# Patient Record
Sex: FEMALE | Race: BLACK OR AFRICAN AMERICAN | Employment: FULL TIME
[De-identification: names, ages, dates, MRNs, and addresses within clinical notes are randomized per-mention and may not be internally consistent; named-entity substitution may affect disease eponyms.]

---

## 2017-02-12 DIAGNOSIS — L30.9 ECZEMA: ICD-10-CM

## 2017-02-12 DIAGNOSIS — R05.9 COUGH: ICD-10-CM

## 2017-02-13 RX ORDER — HALOBETASOL PROPIONATE 0.05 %
OINTMENT (GRAM) TOPICAL
Qty: 50 G | Refills: 3 | Status: SHIPPED | OUTPATIENT
Start: 2017-02-13 | End: 2017-12-01 | Stop reason: SDUPTHER

## 2017-02-27 ENCOUNTER — OFFICE VISIT (OUTPATIENT)
Dept: FAMILY MEDICINE CLINIC | Facility: CLINIC | Age: 47
End: 2017-02-27

## 2017-02-27 VITALS
SYSTOLIC BLOOD PRESSURE: 142 MMHG | WEIGHT: 256 LBS | HEIGHT: 69 IN | RESPIRATION RATE: 16 BRPM | HEART RATE: 81 BPM | BODY MASS INDEX: 37.92 KG/M2 | DIASTOLIC BLOOD PRESSURE: 90 MMHG | OXYGEN SATURATION: 97 %

## 2017-02-27 DIAGNOSIS — H10.9 BACTERIAL CONJUNCTIVITIS OF LEFT EYE: Primary | ICD-10-CM

## 2017-02-27 DIAGNOSIS — R09.82 POST-NASAL DRIP: ICD-10-CM

## 2017-02-27 DIAGNOSIS — R05.9 COUGH: ICD-10-CM

## 2017-02-27 PROCEDURE — 99214 OFFICE O/P EST MOD 30 MIN: CPT | Performed by: FAMILY MEDICINE

## 2017-02-27 PROCEDURE — G8427 DOCREV CUR MEDS BY ELIG CLIN: HCPCS | Performed by: FAMILY MEDICINE

## 2017-02-27 PROCEDURE — G8417 CALC BMI ABV UP PARAM F/U: HCPCS | Performed by: FAMILY MEDICINE

## 2017-02-27 PROCEDURE — G8484 FLU IMMUNIZE NO ADMIN: HCPCS | Performed by: FAMILY MEDICINE

## 2017-02-27 PROCEDURE — 1036F TOBACCO NON-USER: CPT | Performed by: FAMILY MEDICINE

## 2017-02-27 RX ORDER — FLUCONAZOLE 150 MG/1
150 TABLET ORAL ONCE
Qty: 1 TABLET | Refills: 3 | Status: SHIPPED | OUTPATIENT
Start: 2017-02-27 | End: 2017-02-27

## 2017-02-27 RX ORDER — FLUTICASONE PROPIONATE 50 MCG
1 SPRAY, SUSPENSION (ML) NASAL DAILY
Qty: 1 BOTTLE | Refills: 3 | Status: SHIPPED | OUTPATIENT
Start: 2017-02-27 | End: 2018-03-15 | Stop reason: SDUPTHER

## 2017-02-27 RX ORDER — CIPROFLOXACIN HYDROCHLORIDE 3.5 MG/ML
1 SOLUTION/ DROPS TOPICAL
Qty: 1 BOTTLE | Refills: 0 | Status: SHIPPED | OUTPATIENT
Start: 2017-02-27 | End: 2017-03-06

## 2017-03-13 ENCOUNTER — TELEPHONE (OUTPATIENT)
Dept: FAMILY MEDICINE CLINIC | Age: 47
End: 2017-03-13

## 2017-03-15 DIAGNOSIS — K59.4 PROCTALGIA FUGAX: ICD-10-CM

## 2017-03-15 DIAGNOSIS — K22.89 PAIN OF ESOPHAGUS: ICD-10-CM

## 2017-03-15 DIAGNOSIS — Z87.39 HISTORY OF OSGOOD-SCHLATTER DISEASE: ICD-10-CM

## 2017-03-15 DIAGNOSIS — Z87.42 HISTORY OF ENDOMETRIOSIS: ICD-10-CM

## 2017-03-15 DIAGNOSIS — M54.2 PAIN IN NECK: Primary | ICD-10-CM

## 2017-03-27 DIAGNOSIS — S46.911A MUSCLE STRAIN, SHOULDER REGION, RIGHT, INITIAL ENCOUNTER: ICD-10-CM

## 2017-03-27 RX ORDER — METFORMIN HYDROCHLORIDE 1000 MG/1
TABLET, FILM COATED, EXTENDED RELEASE ORAL
Qty: 30 TABLET | Refills: 3 | Status: SHIPPED | OUTPATIENT
Start: 2017-03-27 | End: 2017-10-29 | Stop reason: SDUPTHER

## 2017-03-27 RX ORDER — CYCLOBENZAPRINE HCL 5 MG
TABLET ORAL
Qty: 60 TABLET | Refills: 0 | Status: SHIPPED | OUTPATIENT
Start: 2017-03-27 | End: 2017-12-01 | Stop reason: SDUPTHER

## 2017-06-19 ENCOUNTER — TELEPHONE (OUTPATIENT)
Dept: FAMILY MEDICINE CLINIC | Age: 47
End: 2017-06-19

## 2017-12-01 ENCOUNTER — OFFICE VISIT (OUTPATIENT)
Dept: FAMILY MEDICINE CLINIC | Age: 47
End: 2017-12-01
Payer: COMMERCIAL

## 2017-12-01 ENCOUNTER — HOSPITAL ENCOUNTER (OUTPATIENT)
Age: 47
Setting detail: SPECIMEN
Discharge: HOME | End: 2017-12-01
Payer: COMMERCIAL

## 2017-12-01 VITALS
OXYGEN SATURATION: 96 % | BODY MASS INDEX: 38.36 KG/M2 | WEIGHT: 259 LBS | DIASTOLIC BLOOD PRESSURE: 90 MMHG | HEIGHT: 69 IN | HEART RATE: 81 BPM | SYSTOLIC BLOOD PRESSURE: 118 MMHG

## 2017-12-01 DIAGNOSIS — N62 MACROMASTIA: ICD-10-CM

## 2017-12-01 DIAGNOSIS — L30.9 ECZEMA, UNSPECIFIED TYPE: ICD-10-CM

## 2017-12-01 DIAGNOSIS — Z12.39 SCREENING FOR BREAST CANCER: ICD-10-CM

## 2017-12-01 DIAGNOSIS — S46.911A MUSCLE STRAIN, SHOULDER REGION, RIGHT, INITIAL ENCOUNTER: Primary | ICD-10-CM

## 2017-12-01 DIAGNOSIS — E03.9 HYPOTHYROIDISM, UNSPECIFIED TYPE: ICD-10-CM

## 2017-12-01 LAB
-: ABNORMAL
ABSOLUTE EOS #: 0.04 K/UL (ref 0–0.44)
ABSOLUTE IMMATURE GRANULOCYTE: 0.03 K/UL (ref 0–0.3)
ABSOLUTE LYMPH #: 2.7 K/UL (ref 1.1–3.7)
ABSOLUTE MONO #: 0.43 K/UL (ref 0.1–1.2)
ALBUMIN SERPL-MCNC: 3.9 G/DL (ref 3.5–5.2)
ALBUMIN/GLOBULIN RATIO: 1 (ref 1–2.5)
ALP BLD-CCNC: 62 U/L (ref 35–104)
ALT SERPL-CCNC: 10 U/L (ref 5–33)
AMORPHOUS: ABNORMAL
ANION GAP SERPL CALCULATED.3IONS-SCNC: 16 MMOL/L (ref 9–17)
AST SERPL-CCNC: 14 U/L
BACTERIA: ABNORMAL
BASOPHILS # BLD: 1 % (ref 0–2)
BASOPHILS ABSOLUTE: 0.1 K/UL (ref 0–0.2)
BILIRUB SERPL-MCNC: 0.39 MG/DL (ref 0.3–1.2)
BILIRUBIN URINE: NEGATIVE
BUN BLDV-MCNC: 10 MG/DL (ref 6–20)
BUN/CREAT BLD: NORMAL (ref 9–20)
C-REACTIVE PROTEIN: 7.9 MG/L (ref 0–5)
CALCIUM SERPL-MCNC: 8.8 MG/DL (ref 8.6–10.4)
CASTS UA: ABNORMAL /LPF (ref 0–8)
CHLORIDE BLD-SCNC: 103 MMOL/L (ref 98–107)
CHOLESTEROL/HDL RATIO: 3.8
CHOLESTEROL: 207 MG/DL
CO2: 23 MMOL/L (ref 20–31)
COLOR: YELLOW
COMMENT UA: ABNORMAL
CREAT SERPL-MCNC: 0.74 MG/DL (ref 0.5–0.9)
CRYSTALS, UA: ABNORMAL /HPF
DIFFERENTIAL TYPE: NORMAL
EOSINOPHILS RELATIVE PERCENT: 1 % (ref 1–4)
EPITHELIAL CELLS UA: ABNORMAL /HPF (ref 0–5)
ESTIMATED AVERAGE GLUCOSE: 91 MG/DL
GFR AFRICAN AMERICAN: >60 ML/MIN
GFR NON-AFRICAN AMERICAN: >60 ML/MIN
GFR SERPL CREATININE-BSD FRML MDRD: NORMAL ML/MIN/{1.73_M2}
GFR SERPL CREATININE-BSD FRML MDRD: NORMAL ML/MIN/{1.73_M2}
GLUCOSE BLD-MCNC: 80 MG/DL (ref 70–99)
GLUCOSE URINE: NEGATIVE
HBA1C MFR BLD: 4.8 % (ref 4–6)
HCT VFR BLD CALC: 44.1 % (ref 36.3–47.1)
HDLC SERPL-MCNC: 54 MG/DL
HEMOGLOBIN: 13.4 G/DL (ref 11.9–15.1)
IMMATURE GRANULOCYTES: 0 %
INSULIN COMMENT: NORMAL
INSULIN REFERENCE RANGE:: NORMAL
INSULIN: 19.2 MU/L
KETONES, URINE: NEGATIVE
LDL CHOLESTEROL: 141 MG/DL (ref 0–130)
LEUKOCYTE ESTERASE, URINE: NEGATIVE
LYMPHOCYTES # BLD: 34 % (ref 24–43)
MCH RBC QN AUTO: 28.9 PG (ref 25.2–33.5)
MCHC RBC AUTO-ENTMCNC: 30.4 G/DL (ref 28.4–34.8)
MCV RBC AUTO: 95 FL (ref 82.6–102.9)
MONOCYTES # BLD: 5 % (ref 3–12)
MUCUS: ABNORMAL
NITRITE, URINE: NEGATIVE
OTHER OBSERVATIONS UA: ABNORMAL
PDW BLD-RTO: 12 % (ref 11.8–14.4)
PH UA: 6 (ref 5–8)
PLATELET # BLD: 440 K/UL (ref 138–453)
PLATELET ESTIMATE: NORMAL
PMV BLD AUTO: 8.7 FL (ref 8.1–13.5)
POTASSIUM SERPL-SCNC: 4.4 MMOL/L (ref 3.7–5.3)
PROTEIN UA: NEGATIVE
RBC # BLD: 4.64 M/UL (ref 3.95–5.11)
RBC # BLD: NORMAL 10*6/UL
RBC UA: ABNORMAL /HPF (ref 0–4)
RENAL EPITHELIAL, UA: ABNORMAL /HPF
SEDIMENTATION RATE, ERYTHROCYTE: 36 MM (ref 0–20)
SEG NEUTROPHILS: 59 % (ref 36–65)
SEGMENTED NEUTROPHILS ABSOLUTE COUNT: 4.68 K/UL (ref 1.5–8.1)
SODIUM BLD-SCNC: 142 MMOL/L (ref 135–144)
SPECIFIC GRAVITY UA: 1.02 (ref 1–1.03)
THYROXINE, FREE: 0.87 NG/DL (ref 0.93–1.7)
TOTAL PROTEIN: 7.9 G/DL (ref 6.4–8.3)
TRICHOMONAS: ABNORMAL
TRIGL SERPL-MCNC: 61 MG/DL
TSH SERPL DL<=0.05 MIU/L-ACNC: 1.07 MIU/L (ref 0.3–5)
TURBIDITY: CLEAR
URINE HGB: ABNORMAL
UROBILINOGEN, URINE: NORMAL
VLDLC SERPL CALC-MCNC: ABNORMAL MG/DL (ref 1–30)
WBC # BLD: 8 K/UL (ref 3.5–11.3)
WBC # BLD: NORMAL 10*3/UL
WBC UA: ABNORMAL /HPF (ref 0–5)
YEAST: ABNORMAL

## 2017-12-01 PROCEDURE — G8427 DOCREV CUR MEDS BY ELIG CLIN: HCPCS | Performed by: FAMILY MEDICINE

## 2017-12-01 PROCEDURE — G8417 CALC BMI ABV UP PARAM F/U: HCPCS | Performed by: FAMILY MEDICINE

## 2017-12-01 PROCEDURE — 1036F TOBACCO NON-USER: CPT | Performed by: FAMILY MEDICINE

## 2017-12-01 PROCEDURE — 99214 OFFICE O/P EST MOD 30 MIN: CPT | Performed by: FAMILY MEDICINE

## 2017-12-01 PROCEDURE — G8482 FLU IMMUNIZE ORDER/ADMIN: HCPCS | Performed by: FAMILY MEDICINE

## 2017-12-01 RX ORDER — CYCLOBENZAPRINE HCL 5 MG
TABLET ORAL
Qty: 180 TABLET | Refills: 0 | Status: SHIPPED | OUTPATIENT
Start: 2017-12-01 | End: 2018-10-01 | Stop reason: SDUPTHER

## 2017-12-01 RX ORDER — NAPROXEN 500 MG/1
500 TABLET ORAL 2 TIMES DAILY WITH MEALS
Qty: 180 TABLET | Refills: 0 | Status: SHIPPED | OUTPATIENT
Start: 2017-12-01 | End: 2018-10-01 | Stop reason: SDUPTHER

## 2017-12-01 RX ORDER — LEVOTHYROXINE SODIUM 0.03 MG/1
TABLET ORAL
Qty: 90 TABLET | Refills: 0 | Status: SHIPPED | OUTPATIENT
Start: 2017-12-01

## 2017-12-01 RX ORDER — HALOBETASOL PROPIONATE 0.05 %
OINTMENT (GRAM) TOPICAL
Qty: 50 G | Refills: 3 | Status: SHIPPED | OUTPATIENT
Start: 2017-12-01 | End: 2019-08-14

## 2017-12-01 RX ORDER — ETODOLAC 400 MG/1
TABLET, FILM COATED ORAL
Refills: 0 | COMMUNITY
Start: 2017-11-10

## 2017-12-01 NOTE — PROGRESS NOTES
Visit Information    Have you changed or started any medications since your last visit including any over-the-counter medicines, vitamins, or herbal medicines? no   Have you stopped taking any of your medications? Is so, why? -  no  Are you having any side effects from any of your medications? - no    Have you seen any other physician or provider since your last visit? yes    Have you had any other diagnostic tests since your last visit? yes    Have you been seen in the emergency room and/or had an admission in a hospital since we last saw you?  no   Have you had your routine dental cleaning in the past 6 months?  yes     Do you have an active MyChart account? If no, what is the barrier?   Yes    Patient Care Team:  Loni Espinal MD as PCP - General (Family Medicine)    Medical History Review  Past Medical, Family, and Social History reviewed and does not contribute to the patient presenting condition    Health Maintenance   Topic Date Due    HIV screen  08/19/1985    Flu vaccine (1) 09/01/2017    Cervical cancer screen  12/16/2018    Lipid screen  08/04/2021    DTaP/Tdap/Td vaccine (2 - Td) 08/04/2026

## 2017-12-01 NOTE — PROGRESS NOTES
At.  She wants to know what she can do about this. Patient also wants to know what she can lose weight. She has been trying but did not lose any weight so far. Also wants to know her insulin level. Because she knows she is insulin resistance. Review of Systems   Constitutional: Negative for fever and unexpected weight change. Respiratory: Negative for cough and shortness of breath. Cardiovascular: Negative for chest pain and leg swelling. Gastrointestinal: Negative for diarrhea, constipation and blood in stool. Genitourinary: Negative for dysuria and hematuria. for large breasts. Musculoskeletal: Negative for gait problem. Hospital to for lower back pain. Positive for pain in her groin area on and off and lifting the legs. Skin: Negative for color change and rash. Neurological: Negative for dizziness and headaches. Psychiatric/Behavioral: Negative for confusion and agitation. Objective:   Physical Exam  Constitutional: VS (see above). General appearance: normal development, habitus and attention, no deformities. Eyes: normal conjunctiva and lids. CAV: RRR, no RMG. No edema lower extremities. Pulmo: CTA bilateral, no CWR. Abdomen: Soft. Bowel sounds positive. Soft. Nontender to palpate. No guarding or rebounding. Skin: no rashes, lesions or ulcers. Musculoskeletal: normal gait. Nails: no clubbing or cyanosis. There is some tenderness in her mid lower back present. More so paraspinal area. Psychiatric: alert and oriented to place, time and person. Normal mood and affect. Assessment:      1. Muscle strain, shoulder region, right, initial encounter  cyclobenzaprine (FLEXERIL) 5 MG tablet   2. Eczema, unspecified type  CBC Auto Differential    Comprehensive Metabolic Panel    Urinalysis    TSH without Reflex    T4, Free    Lipid Panel    Hemoglobin A1C    Thyroid Peroxidase Antibody    Sedimentation Rate    C-Reactive Protein    Insulin, total   3.  Macromastia  CBC Auto Differential    Comprehensive Metabolic Panel    Urinalysis    TSH without Reflex    T4, Free    Lipid Panel    Hemoglobin A1C    Thyroid Peroxidase Antibody    Sedimentation Rate    C-Reactive Protein    Insulin, total   4. Hypothyroidism, unspecified type     5. Screening for breast cancer  [facility] Screen Routine       Plan:   I will order multiple laboratory studies. I discussed with the patient that she start needs to start exercising for lower back. His ibuprofen. Flexeril. I discussed with her losing weight. It is a lifestyle change. Small portions. Exercising. I did give her a printout about Adipex. If needed, she should come in and fill it. She needs to lose weight before she has a breast reduction. Call or return to clinic prn if these symptoms worsen or fail to improve as anticipated. I have reviewed the instructions with the patient, answering all questions to her satisfaction. Return in about 4 weeks (around 12/29/2017), or if symptoms worsen or fail to improve. Orders Placed This Encounter   Procedures    [facility] Screen Routine     Order Specific Question:   Reason for exam:     Answer:   screening    CBC Auto Differential     Standing Status:   Future     Number of Occurrences:   1     Standing Expiration Date:   12/1/2018    Comprehensive Metabolic Panel     Standing Status:   Future     Number of Occurrences:   1     Standing Expiration Date:   12/1/2018    Urinalysis     Standing Status:   Future     Number of Occurrences:   1     Standing Expiration Date:   12/1/2018     Order Specific Question:   SPECIFY(EX-CATH,MIDSTREAM,CYSTO,ETC)?      Answer:   midstream    TSH without Reflex     Standing Status:   Future     Number of Occurrences:   1     Standing Expiration Date:   12/1/2018    T4, Free     Standing Status:   Future     Number of Occurrences:   1     Standing Expiration Date:   12/1/2018    Lipid Panel     Standing Status:   Future     Number of Occurrences:   1     Standing

## 2017-12-01 NOTE — PATIENT INSTRUCTIONS
state;  · if you have a history of drug or alcohol abuse; or  · if you are allergic to other diet pills, amphetamines, stimulants, or cold medications. Taking phentermine together with other diet medications such as fenfluramine (Phen-Fen) or dexfenfluramine (Redux) can cause a rare fatal lung disorder called pulmonary hypertension. Do not take phentermine with any other diet medications without your doctor's advice. To make sure you phentermine is safe for you, tell your doctor if you have:  · high blood pressure;  · diabetes;  · kidney disease;  · a thyroid disorder; or  · if you are allergic to aspirin or to yellow food dye (FD & C Yellow No. 5, or tartrazine). Phentermine may be habit forming. Never share phentermine with another person, especially someone with a history of drug abuse or addiction. Keep the medication in a place where others cannot get to it. FDA pregnancy category X. Weight loss during pregnancy can harm an unborn baby, even if you are overweight. Do not use phentermine if you are pregnant. Phentermine can pass into breast milk and may harm a nursing baby. You should not breast-feed while taking phentermine. Do not give this medication to a child younger than 12years old. How should I take phentermine? Follow all directions on your prescription label. Do not take this medicine in larger or smaller amounts or for longer than recommended. Some brands of phentermine should be taken on an empty stomach before breakfast or within 2 hours after breakfast.  Suprenza disintegrating tablets can be taken with or without food. Using dry hands, remove the Suprenza tablet from the medicine bottle and place the tablet in your mouth. It will begin to dissolve right away. Do not swallow the tablet whole. Allow it to dissolve in your mouth without chewing.   To prevent sleep problems, take this medication early in the day, no later than 6:00pm.  Talk with your doctor if you have increased hunger or if you otherwise think the medication is not working properly. Taking more of this medication will not make it more effective and can cause serious, life-threatening side effects. Phentermine should be taken only for a short time, such as a few weeks. Do not stop using phentermine suddenly, or you could have unpleasant withdrawal symptoms. Ask your doctor how to safely stop using phentermine. Store at room temperature away from moisture and heat. Keep track of the amount of medicine used from each new bottle. Phentermine is a drug of abuse and you should be aware if anyone is using your medicine improperly or without a prescription. What happens if I miss a dose? Take the missed dose as soon as you remember. Skip the missed dose if it is almost time for your next scheduled dose. Do not  take extra medicine to make up the missed dose. What happens if I overdose? Seek emergency medical attention or call the Poison Help line at 1-121.775.5425. An overdose of phentermine can be fatal.  What should I avoid while taking phentermine? Drinking alcohol can increase certain side effects of phentermine. Phentermine may impair your thinking or reactions. Be careful if you drive or do anything that requires you to be alert. What are the possible side effects of phentermine? Get emergency medical help if you have any of these signs of an allergic reaction: hives; wheezing, chest tightness, trouble breathing; swelling of your face, lips, tongue, or throat.   Call your doctor at once if you have a serious side effect such as:  · feeling short of breath, even with mild exertion;  · chest pain, feeling like you might pass out;  · swelling in your ankles or feet;  · pounding heartbeats or fluttering in your chest;  · confusion or irritability, unusual thoughts or behavior;  · feelings of extreme happiness or sadness; or  · dangerously high blood pressure (severe headache, blurred vision, buzzing in your ears, anxiety, chest pain, shortness of breath, uneven heartbeats, seizure). Common side effects may include:  · feeling restless or hyperactive;  · headache, dizziness, tremors;  · sleep problems (insomnia);  · dry mouth or an unpleasant taste in your mouth;  · diarrhea or constipation, upset stomach; or  · increased or decreased interest in sex, impotence. This is not a complete list of side effects and others may occur. Call your doctor for medical advice about side effects. You may report side effects to FDA at 8-706-FDA-7387. What other drugs will affect phentermine? Taking this medicine with other stimulant drugs that make you restless or hyperactive can worsen these effects. Ask your doctor before taking phentermine with diet pills, other stimulants, or medicine to treat attention deficit hyperactivity disorder (ADHD). Tell your doctor about all medicines you use, and those you start or stop using during your treatment with phentermine, especially:  · an antidepressant --citalopram, escitalopram, fluoxetine, fluvoxamine, paroxetine, sertraline. This list is not complete. Other drugs may interact with phentermine, including prescription and over-the-counter medicines, vitamins, and herbal products. Not all possible interactions are listed in this medication guide. Where can I get more information? Your pharmacist can provide more information about phentermine. Remember, keep this and all other medicines out of the reach of children, never share your medicines with others, and use this medication only for the indication prescribed. Every effort has been made to ensure that the information provided by [address] is accurate, up-to-date, and complete, but no guarantee is made to that effect. Drug information contained herein may be time sensitive.  [facility] information has been compiled for use by healthcare practitioners and consumers in the [place] and therefore [facility] does not warrant that uses outside of the [place] are appropriate, unless specifically indicated otherwise. [facility]'s drug information does not endorse drugs, diagnose patients or recommend therapy. [facility] drug information is an informational resource designed to assist licensed healthcare practitioners in caring for their patients and/or to serve consumers viewing this service as a supplement to, and not a substitute for, the expertise, skill, knowledge and judgment of healthcare practitioners. The absence of a warning for a given drug or drug combination in no way should be construed to indicate that the drug or drug combination is safe, effective or appropriate for any given patient. [facility] does not assume any responsibility for any aspect of healthcare administered with the aid of information [facility] provides. The information contained herein is not intended to cover all possible uses, directions, precautions, warnings, drug interactions, allergic reactions, or adverse effects. If you have questions about the drugs you are taking, check with your doctor, nurse or pharmacist.  Copyright 5554-4198 [address]. Version: 9.02. Revision date: 10/4/2013. Care instructions adapted under license by [facility] ([facility]). If you have questions about a medical condition or this instruction, always ask your healthcare professional. [place] [address] any warranty or liability for your use of this information.

## 2017-12-04 LAB — THYROID PEROXIDASE (TPO) AB: <10 IU/ML (ref 0–35)

## 2017-12-28 ENCOUNTER — OFFICE VISIT (OUTPATIENT)
Dept: FAMILY MEDICINE CLINIC | Age: 47
End: 2017-12-28
Payer: COMMERCIAL

## 2017-12-28 VITALS
BODY MASS INDEX: 37.92 KG/M2 | OXYGEN SATURATION: 97 % | WEIGHT: 256 LBS | HEART RATE: 85 BPM | SYSTOLIC BLOOD PRESSURE: 122 MMHG | HEIGHT: 69 IN | DIASTOLIC BLOOD PRESSURE: 81 MMHG

## 2017-12-28 DIAGNOSIS — E66.9 OBESITY (BMI 35.0-39.9 WITHOUT COMORBIDITY): ICD-10-CM

## 2017-12-28 DIAGNOSIS — K21.9 GASTROESOPHAGEAL REFLUX DISEASE, ESOPHAGITIS PRESENCE NOT SPECIFIED: Primary | ICD-10-CM

## 2017-12-28 PROCEDURE — G8427 DOCREV CUR MEDS BY ELIG CLIN: HCPCS | Performed by: FAMILY MEDICINE

## 2017-12-28 PROCEDURE — 1036F TOBACCO NON-USER: CPT | Performed by: FAMILY MEDICINE

## 2017-12-28 PROCEDURE — G8482 FLU IMMUNIZE ORDER/ADMIN: HCPCS | Performed by: FAMILY MEDICINE

## 2017-12-28 PROCEDURE — 99213 OFFICE O/P EST LOW 20 MIN: CPT | Performed by: FAMILY MEDICINE

## 2017-12-28 PROCEDURE — G8417 CALC BMI ABV UP PARAM F/U: HCPCS | Performed by: FAMILY MEDICINE

## 2017-12-28 RX ORDER — HYDROCHLOROTHIAZIDE 25 MG/1
12.5 TABLET ORAL DAILY
Qty: 45 TABLET | Refills: 1 | Status: SHIPPED | OUTPATIENT
Start: 2017-12-28 | End: 2018-10-01

## 2017-12-28 RX ORDER — RANITIDINE 150 MG/1
150 TABLET ORAL 2 TIMES DAILY
Qty: 60 TABLET | Refills: 3 | Status: SHIPPED | OUTPATIENT
Start: 2017-12-28 | End: 2019-09-23 | Stop reason: SDUPTHER

## 2017-12-28 RX ORDER — PHENTERMINE HYDROCHLORIDE 37.5 MG/1
37.5 TABLET ORAL
Qty: 30 TABLET | Refills: 0 | Status: SHIPPED | OUTPATIENT
Start: 2017-12-28 | End: 2018-01-27

## 2017-12-28 ASSESSMENT — PATIENT HEALTH QUESTIONNAIRE - PHQ9
SUM OF ALL RESPONSES TO PHQ QUESTIONS 1-9: 0
SUM OF ALL RESPONSES TO PHQ9 QUESTIONS 1 & 2: 0
2. FEELING DOWN, DEPRESSED OR HOPELESS: 0
1. LITTLE INTEREST OR PLEASURE IN DOING THINGS: 0

## 2017-12-28 NOTE — PROGRESS NOTES
General FM note    Cuate Zhang is a 52 y.o. female who presents today for follow up on her  medical conditions as noted below. Cuate Zhang is c/o of   Chief Complaint   Patient presents with    Back Pain       Patient Active Problem List:     Hypothyroidism     Insulin resistance     Right knee pain     History of endometriosis     Proctalgia fugax     Multiple nevi     Edema of both legs     History of Osgood-Schlatter disease     Cough     Breast pain     Hypertrophy of breast     Pain in neck     Past Medical History:   Diagnosis Date    Gout     Hypothyroidism     Insulin resistance     Diagnosed in 2000      Past Surgical History:   Procedure Laterality Date    CYST REMOVAL  2000    FOOT SURGERY  10/2016    HYSTERECTOMY  12/2008    pt has right ovary    LAPAROSCOPY       Family History   Problem Relation Age of Onset    Cancer Mother     Diabetes Father     High Blood Pressure Father     Arthritis Father     Other Sister      BRAIN TUMOR    High Blood Pressure Maternal Grandmother     Cancer Maternal Grandfather      PROSTATE    Diabetes Maternal Grandfather     High Blood Pressure Paternal Grandmother      Current Outpatient Prescriptions   Medication Sig Dispense Refill    metFORMIN (GLUCOPHAGE) 1000 MG tablet Take 1,000 mg by mouth 2 times daily (with meals)      hydrochlorothiazide (HYDRODIURIL) 25 MG tablet Take 0.5 tablets by mouth daily 45 tablet 1    phentermine (ADIPEX-P) 37.5 MG tablet Take 1 tablet by mouth every morning (before breakfast) .  30 tablet 0    ranitidine (ZANTAC) 150 MG tablet Take 1 tablet by mouth 2 times daily 60 tablet 3    etodolac (LODINE) 400 MG tablet take 1 tablet by mouth twice a day  0    halobetasol (ULTRAVATE) 0.05 % ointment apply to affected area twice a day 50 g 3    levothyroxine (SYNTHROID) 25 MCG tablet take 1 tablet by mouth once daily 90 tablet 0    naproxen (NAPROSYN) 500 MG tablet Take 1 tablet by mouth 2 times daily (with meals) 180 stool.   Musculoskeletal: Negative for back pain and gait problem. Skin: Negative for color change and rash. Objective:   Physical Exam  Constitutional: VS (see above). General appearance: normal development, habitus and attention, no deformities. Eyes: normal conjunctiva and lids. CAV: RRR, no RMG. No edema lower extremities. Pulmo: CTA bilateral, no CWR. Skin: no rashes, lesions or ulcers. Musculoskeletal: normal gait. Nails: no clubbing or cyanosis. Psychiatric: alert and oriented to place, time and person. Normal mood and affect. Assessment:      1. Gastroesophageal reflux disease, esophagitis presence not specified  ranitidine (ZANTAC) 150 MG tablet   2. Obesity (BMI 35.0-39.9 without comorbidity)         Plan:   She normally has his reflux. She will start Zantac. She will follow-up in 4 weeks. see if this is working. We'll start patient on Adipex. First prescription provided. Patient will continue current diet and exercise regimen. I discussed with her to exercise at least 30 minutes 5 times a week. Decrease carbohydrate intake. Increase fibers and protein. See me back in 4 weeks for weight check. Call if any changes. Stop Adipex if you have any side effects. Patient needs to have her urine checked at the next visit. Call or return to clinic prn if these symptoms worsen or fail to improve as anticipated. I have reviewed the instructions with the patient, answering all questions to her satisfaction. Return in about 5 weeks (around 1/29/2018), or if symptoms worsen or fail to improve, for weight. No orders of the defined types were placed in this encounter. Orders Placed This Encounter   Medications    hydrochlorothiazide (HYDRODIURIL) 25 MG tablet     Sig: Take 0.5 tablets by mouth daily     Dispense:  45 tablet     Refill:  1    phentermine (ADIPEX-P) 37.5 MG tablet     Sig: Take 1 tablet by mouth every morning (before breakfast) .      Dispense:  30 tablet     Refill:  0

## 2017-12-28 NOTE — PROGRESS NOTES
Visit Information    Have you changed or started any medications since your last visit including any over-the-counter medicines, vitamins, or herbal medicines? no   Have you stopped taking any of your medications? Is so, why? -  no  Are you having any side effects from any of your medications? - no    Have you seen any other physician or provider since your last visit? yes - Dr Madeline Murphy with [facility]   Have you had any other diagnostic tests since your last visit? yes - xrays   Have you been seen in the emergency room and/or had an admission in a hospital since we last saw you?  no   Have you had your routine dental cleaning in the past 6 months?  yes     Do you have an active MyChart account? If no, what is the barrier?   Yes    Patient Care Team:  Gerson Ceballos MD as PCP - General (Family Medicine)    Medical History Review  Past Medical, Family, and Social History reviewed and does not contribute to the patient presenting condition    Health Maintenance   Topic Date Due    HIV screen  08/19/1985    Cervical cancer screen  12/16/2018    Lipid screen  12/01/2022    DTaP/Tdap/Td vaccine (2 - Td) 08/04/2026    Flu vaccine  Completed

## 2018-01-30 ENCOUNTER — OFFICE VISIT (OUTPATIENT)
Dept: FAMILY MEDICINE CLINIC | Age: 48
End: 2018-01-30
Payer: COMMERCIAL

## 2018-01-30 VITALS
HEIGHT: 69 IN | SYSTOLIC BLOOD PRESSURE: 139 MMHG | OXYGEN SATURATION: 100 % | HEART RATE: 76 BPM | RESPIRATION RATE: 16 BRPM | WEIGHT: 256 LBS | DIASTOLIC BLOOD PRESSURE: 89 MMHG | BODY MASS INDEX: 37.92 KG/M2

## 2018-01-30 DIAGNOSIS — E66.09 CLASS 2 OBESITY DUE TO EXCESS CALORIES WITHOUT SERIOUS COMORBIDITY WITH BODY MASS INDEX (BMI) OF 37.0 TO 37.9 IN ADULT: ICD-10-CM

## 2018-01-30 DIAGNOSIS — K21.9 GASTROESOPHAGEAL REFLUX DISEASE, ESOPHAGITIS PRESENCE NOT SPECIFIED: Primary | ICD-10-CM

## 2018-01-30 PROCEDURE — 99213 OFFICE O/P EST LOW 20 MIN: CPT | Performed by: FAMILY MEDICINE

## 2018-01-30 PROCEDURE — G8427 DOCREV CUR MEDS BY ELIG CLIN: HCPCS | Performed by: FAMILY MEDICINE

## 2018-01-30 PROCEDURE — G8482 FLU IMMUNIZE ORDER/ADMIN: HCPCS | Performed by: FAMILY MEDICINE

## 2018-01-30 PROCEDURE — 1036F TOBACCO NON-USER: CPT | Performed by: FAMILY MEDICINE

## 2018-01-30 PROCEDURE — G8417 CALC BMI ABV UP PARAM F/U: HCPCS | Performed by: FAMILY MEDICINE

## 2018-01-30 RX ORDER — PHENTERMINE HYDROCHLORIDE 37.5 MG/1
37.5 TABLET ORAL
Qty: 30 TABLET | Refills: 0 | Status: SHIPPED | OUTPATIENT
Start: 2018-01-30 | End: 2018-03-01

## 2018-01-30 NOTE — PROGRESS NOTES
normal development, habitus and attention, no deformities. Eyes: normal conjunctiva and lids. CAV: RRR, no RMG. No edema lower extremities. Pulmo: CTA bilateral, no CWR. Skin: no rashes, lesions or ulcers. Musculoskeletal: normal gait. Nails: no clubbing or cyanosis. Psychiatric: alert and oriented to place, time and person. Normal mood and affect. Assessment:      1. Gastroesophageal reflux disease, esophagitis presence not specified     2. Class 2 obesity due to excess calories without serious comorbidity with body mass index (BMI) of 37.0 to 37.9 in adult  phentermine (ADIPEX-P) 37.5 MG tablet       Plan:   We'll continue Zantac for acid reflux. Patient will continue current diet and exercise regimen. I discussed with her to exercise at least 30 minutes 5 times a week. Decrease carbohydrate intake. Increase fibers and protein. See me back in 4 weeks for weight check. Call if any changes. Stop Adipex if you have any side effects. Call or return to clinic prn if these symptoms worsen or fail to improve as anticipated. I have reviewed the instructions with the patient, answering all questions to her satisfaction. Return in about 4 weeks (around 2/27/2018), or if symptoms worsen or fail to improve. No orders of the defined types were placed in this encounter. Orders Placed This Encounter   Medications    phentermine (ADIPEX-P) 37.5 MG tablet     Sig: Take 1 tablet by mouth every morning (before breakfast) for 30 days.      Dispense:  30 tablet     Refill:  0       Electronically signed by Yara Arenas MD on 1/31/2018 at 7:04 AM       (Please note that portions of this note were completed with a voice recognition program. Efforts were made to edit the dictations but occasionally words are mis-transcribed.)

## 2018-03-15 DIAGNOSIS — R09.82 POST-NASAL DRIP: ICD-10-CM

## 2018-03-15 RX ORDER — FLUTICASONE PROPIONATE 50 MCG
SPRAY, SUSPENSION (ML) NASAL
Qty: 16 G | Refills: 3 | Status: SHIPPED | OUTPATIENT
Start: 2018-03-15 | End: 2018-05-24 | Stop reason: SDUPTHER

## 2018-04-17 DIAGNOSIS — Z12.39 SCREENING BREAST EXAMINATION: ICD-10-CM

## 2018-04-17 DIAGNOSIS — N63.20 LEFT BREAST LUMP: Primary | ICD-10-CM

## 2018-04-26 ENCOUNTER — HOSPITAL ENCOUNTER (OUTPATIENT)
Dept: ULTRASOUND IMAGING | Age: 48
Discharge: HOME | End: 2018-04-28
Payer: COMMERCIAL

## 2018-04-26 ENCOUNTER — HOSPITAL ENCOUNTER (OUTPATIENT)
Dept: MAMMOGRAPHY | Age: 48
Discharge: HOME | End: 2018-04-28
Payer: COMMERCIAL

## 2018-04-26 DIAGNOSIS — R92.8 ABNORMAL MAMMOGRAM: ICD-10-CM

## 2018-04-26 DIAGNOSIS — N63.20 LEFT BREAST LUMP: ICD-10-CM

## 2018-04-26 DIAGNOSIS — Z12.39 SCREENING BREAST EXAMINATION: ICD-10-CM

## 2018-04-26 PROCEDURE — G0279 TOMOSYNTHESIS, MAMMO: HCPCS

## 2018-04-26 PROCEDURE — 76642 ULTRASOUND BREAST LIMITED: CPT

## 2018-05-03 ENCOUNTER — TELEPHONE (OUTPATIENT)
Dept: FAMILY MEDICINE CLINIC | Age: 48
End: 2018-05-03

## 2018-05-03 ENCOUNTER — OFFICE VISIT (OUTPATIENT)
Dept: FAMILY MEDICINE CLINIC | Age: 48
End: 2018-05-03
Payer: COMMERCIAL

## 2018-05-03 VITALS
BODY MASS INDEX: 38.36 KG/M2 | DIASTOLIC BLOOD PRESSURE: 85 MMHG | WEIGHT: 259 LBS | HEIGHT: 69 IN | HEART RATE: 86 BPM | SYSTOLIC BLOOD PRESSURE: 127 MMHG | OXYGEN SATURATION: 96 % | RESPIRATION RATE: 16 BRPM

## 2018-05-03 DIAGNOSIS — R53.83 OTHER FATIGUE: ICD-10-CM

## 2018-05-03 DIAGNOSIS — R00.2 PALPITATION: Primary | ICD-10-CM

## 2018-05-03 LAB
BASOPHILS ABSOLUTE: NORMAL /ΜL
BASOPHILS RELATIVE PERCENT: NORMAL %
BUN BLDV-MCNC: NORMAL MG/DL
CALCIUM SERPL-MCNC: NORMAL MG/DL
CHLORIDE BLD-SCNC: NORMAL MMOL/L
CO2: NORMAL MMOL/L
CREAT SERPL-MCNC: NORMAL MG/DL
EOSINOPHILS ABSOLUTE: NORMAL /ΜL
EOSINOPHILS RELATIVE PERCENT: NORMAL %
GFR CALCULATED: NORMAL
GLUCOSE BLD-MCNC: NORMAL MG/DL
HCT VFR BLD CALC: NORMAL % (ref 36–46)
HEMOGLOBIN: NORMAL G/DL (ref 12–16)
LYMPHOCYTES ABSOLUTE: NORMAL /ΜL
LYMPHOCYTES RELATIVE PERCENT: NORMAL %
MCH RBC QN AUTO: NORMAL PG
MCHC RBC AUTO-ENTMCNC: NORMAL G/DL
MCV RBC AUTO: NORMAL FL
MONOCYTES ABSOLUTE: NORMAL /ΜL
MONOCYTES RELATIVE PERCENT: NORMAL %
NEUTROPHILS ABSOLUTE: NORMAL /ΜL
NEUTROPHILS RELATIVE PERCENT: NORMAL %
PDW BLD-RTO: NORMAL %
PLATELET # BLD: NORMAL K/ΜL
PMV BLD AUTO: NORMAL FL
POTASSIUM SERPL-SCNC: NORMAL MMOL/L
RBC # BLD: NORMAL 10^6/ΜL
SODIUM BLD-SCNC: NORMAL MMOL/L
T4 FREE: NORMAL
TSH SERPL DL<=0.05 MIU/L-ACNC: NORMAL UIU/ML
VITAMIN D 25-HYDROXY: NORMAL
VITAMIN D2, 25 HYDROXY: NORMAL
VITAMIN D3,25 HYDROXY: NORMAL
WBC # BLD: NORMAL 10^3/ML

## 2018-05-03 PROCEDURE — 1036F TOBACCO NON-USER: CPT | Performed by: FAMILY MEDICINE

## 2018-05-03 PROCEDURE — G8427 DOCREV CUR MEDS BY ELIG CLIN: HCPCS | Performed by: FAMILY MEDICINE

## 2018-05-03 PROCEDURE — 99213 OFFICE O/P EST LOW 20 MIN: CPT | Performed by: FAMILY MEDICINE

## 2018-05-03 PROCEDURE — G8417 CALC BMI ABV UP PARAM F/U: HCPCS | Performed by: FAMILY MEDICINE

## 2018-05-04 ENCOUNTER — HOSPITAL ENCOUNTER (OUTPATIENT)
Dept: NON INVASIVE DIAGNOSTICS | Age: 48
Discharge: HOME | End: 2018-05-04
Payer: COMMERCIAL

## 2018-05-04 DIAGNOSIS — R00.2 PALPITATION: ICD-10-CM

## 2018-05-04 PROCEDURE — 93226 XTRNL ECG REC<48 HR SCAN A/R: CPT

## 2018-05-04 PROCEDURE — 93225 XTRNL ECG REC<48 HRS REC: CPT

## 2018-05-07 DIAGNOSIS — R53.83 OTHER FATIGUE: ICD-10-CM

## 2018-05-08 DIAGNOSIS — E55.9 VITAMIN D DEFICIENCY: Primary | ICD-10-CM

## 2018-05-08 LAB
ACQUISITION DURATION: NORMAL S
AVERAGE HEART RATE: 89 BPM
FASTEST SUPRAVENTRICULAR RATE: 131 BPM
HOOKUP DATE: NORMAL
HOOKUP TIME: NORMAL
LONGEST SUPRAVENTRICULAR RATE: 131 BPM
MAX HEART RATE TIME/DATE: NORMAL
MAX HEART RATE: 137 BPM
MIN HEART RATE TIME/DATE: NORMAL
MIN HEART RATE: 61 BPM
NUMBER OF FASTEST SUPRAVENTRICULAR BEATS: 11
NUMBER OF LONGEST SUPRAVENTRICULAR BEATS: 11
NUMBER OF QRS COMPLEXES: NORMAL
NUMBER OF SUPRAVENTRICULAR BEATS IN RUNS: 11
NUMBER OF SUPRAVENTRICULAR COUPLETS: 0
NUMBER OF SUPRAVENTRICULAR ECTOPICS: 431
NUMBER OF SUPRAVENTRICULAR ISOLATED BEATS: 420
NUMBER OF SUPRAVENTRICULAR RUNS: 1
NUMBER OF VENTRICULAR BEATS IN RUNS: 0
NUMBER OF VENTRICULAR BIGEMINAL CYCLES: 3
NUMBER OF VENTRICULAR COUPLETS: 4
NUMBER OF VENTRICULAR ECTOPICS: 17
NUMBER OF VENTRICULAR ISOLATED BEATS: 9
NUMBER OF VENTRICULAR RUNS: 0

## 2018-05-08 RX ORDER — ERGOCALCIFEROL (VITAMIN D2) 1250 MCG
50000 CAPSULE ORAL WEEKLY
Qty: 4 CAPSULE | Refills: 3 | Status: SHIPPED | OUTPATIENT
Start: 2018-05-08

## 2018-05-24 ENCOUNTER — OFFICE VISIT (OUTPATIENT)
Dept: FAMILY MEDICINE CLINIC | Age: 48
End: 2018-05-24
Payer: COMMERCIAL

## 2018-05-24 VITALS
WEIGHT: 259.2 LBS | RESPIRATION RATE: 16 BRPM | SYSTOLIC BLOOD PRESSURE: 144 MMHG | HEART RATE: 96 BPM | DIASTOLIC BLOOD PRESSURE: 94 MMHG | OXYGEN SATURATION: 97 % | BODY MASS INDEX: 38.39 KG/M2

## 2018-05-24 DIAGNOSIS — R09.82 POST-NASAL DRIP: ICD-10-CM

## 2018-05-24 DIAGNOSIS — I47.1 SVT (SUPRAVENTRICULAR TACHYCARDIA) (HCC): Primary | ICD-10-CM

## 2018-05-24 PROCEDURE — 1036F TOBACCO NON-USER: CPT | Performed by: FAMILY MEDICINE

## 2018-05-24 PROCEDURE — G8427 DOCREV CUR MEDS BY ELIG CLIN: HCPCS | Performed by: FAMILY MEDICINE

## 2018-05-24 PROCEDURE — G8417 CALC BMI ABV UP PARAM F/U: HCPCS | Performed by: FAMILY MEDICINE

## 2018-05-24 PROCEDURE — 99214 OFFICE O/P EST MOD 30 MIN: CPT | Performed by: FAMILY MEDICINE

## 2018-05-24 RX ORDER — FLUTICASONE PROPIONATE 50 MCG
1 SPRAY, SUSPENSION (ML) NASAL DAILY
Qty: 16 G | Refills: 3 | Status: SHIPPED | OUTPATIENT
Start: 2018-05-24

## 2018-05-24 RX ORDER — AZITHROMYCIN 250 MG/1
TABLET, FILM COATED ORAL
Qty: 6 TABLET | Refills: 0 | Status: SHIPPED | OUTPATIENT
Start: 2018-05-24 | End: 2018-06-03

## 2018-10-01 ENCOUNTER — OFFICE VISIT (OUTPATIENT)
Dept: FAMILY MEDICINE CLINIC | Age: 48
End: 2018-10-01
Payer: COMMERCIAL

## 2018-10-01 VITALS
DIASTOLIC BLOOD PRESSURE: 90 MMHG | HEART RATE: 78 BPM | RESPIRATION RATE: 16 BRPM | SYSTOLIC BLOOD PRESSURE: 138 MMHG | HEIGHT: 68 IN | OXYGEN SATURATION: 97 % | WEIGHT: 291 LBS | TEMPERATURE: 97.9 F | BODY MASS INDEX: 44.1 KG/M2

## 2018-10-01 DIAGNOSIS — N62 MACROMASTIA: ICD-10-CM

## 2018-10-01 DIAGNOSIS — I10 ESSENTIAL HYPERTENSION: Primary | ICD-10-CM

## 2018-10-01 DIAGNOSIS — S46.911A MUSCLE STRAIN, SHOULDER REGION, RIGHT, INITIAL ENCOUNTER: ICD-10-CM

## 2018-10-01 DIAGNOSIS — Z23 NEED FOR INFLUENZA VACCINATION: ICD-10-CM

## 2018-10-01 PROCEDURE — 90471 IMMUNIZATION ADMIN: CPT | Performed by: FAMILY MEDICINE

## 2018-10-01 PROCEDURE — G8417 CALC BMI ABV UP PARAM F/U: HCPCS | Performed by: FAMILY MEDICINE

## 2018-10-01 PROCEDURE — G8482 FLU IMMUNIZE ORDER/ADMIN: HCPCS | Performed by: FAMILY MEDICINE

## 2018-10-01 PROCEDURE — G8427 DOCREV CUR MEDS BY ELIG CLIN: HCPCS | Performed by: FAMILY MEDICINE

## 2018-10-01 PROCEDURE — 90686 IIV4 VACC NO PRSV 0.5 ML IM: CPT | Performed by: FAMILY MEDICINE

## 2018-10-01 PROCEDURE — 99214 OFFICE O/P EST MOD 30 MIN: CPT | Performed by: FAMILY MEDICINE

## 2018-10-01 PROCEDURE — 1036F TOBACCO NON-USER: CPT | Performed by: FAMILY MEDICINE

## 2018-10-01 RX ORDER — CYCLOBENZAPRINE HCL 5 MG
TABLET ORAL
Qty: 180 TABLET | Refills: 0 | Status: SHIPPED | OUTPATIENT
Start: 2018-10-01

## 2018-10-01 RX ORDER — NAPROXEN 500 MG/1
500 TABLET ORAL 2 TIMES DAILY WITH MEALS
Qty: 180 TABLET | Refills: 0 | Status: SHIPPED | OUTPATIENT
Start: 2018-10-01 | End: 2019-08-12 | Stop reason: SDUPTHER

## 2018-10-01 RX ORDER — AMLODIPINE BESYLATE 5 MG/1
2.5 TABLET ORAL DAILY
Qty: 15 TABLET | Refills: 1 | Status: SHIPPED | OUTPATIENT
Start: 2018-10-01 | End: 2018-12-21 | Stop reason: SDUPTHER

## 2018-10-01 NOTE — PROGRESS NOTES
Visit Information    Have you changed or started any medications since your last visit including any over-the-counter medicines, vitamins, or herbal medicines? no   Are you having any side effects from any of your medications? -  no  Have you stopped taking any of your medications? Is so, why? -  yes , HCTZ    Have you seen any other physician or provider since your last visit? Yes - Records Requested  cardiologistHave you had any other diagnostic tests since your last visit? Yes - Records Obtained ECHO  Have you been seen in the emergency room and/or had an admission to a hospital since we last saw you? No  Have you had your routine dental cleaning in the past 6 months? yes     Have you activated your [employer] account? If not, what are your barriers?  Yes     Patient Care Team:  Pastor Paige MD as PCP - General (Family Medicine)    Medical History Review  Past Medical, Family, and Social History reviewed and does not contribute to the patient presenting condition    Health Maintenance   Topic Date Due    HIV screen  08/19/1985    Flu vaccine (1) 09/01/2018    Cervical cancer screen  12/16/2018    TSH testing  05/03/2019    Potassium monitoring  05/03/2019    Creatinine monitoring  05/03/2019    Lipid screen  12/01/2022    DTaP/Tdap/Td vaccine (2 - Td) 08/04/2026

## 2018-10-01 NOTE — PATIENT INSTRUCTIONS
your chest pain is severe or ongoing. If you are being treated for high blood pressure, keep using amlodipine even if you feel well. High blood pressure often has no symptoms. You may need to use blood pressure medicine for the rest of your life. Your hypertension or heart condition may be treated with a combination of drugs. Use all medications as directed by your doctor. Read the medication guide or patient instructions provided with each medication. Do not change your doses or stop taking any of your medications without your doctor's advice. This is especially important if you also take nitroglycerin. Amlodipine is only part of a complete program of treatment that may also include diet, exercise, weight control, and other medications. Follow your diet, medication, and exercise routines very closely. Store at room temperature away from moisture, heat, and light. What happens if I miss a dose? Take the missed dose as soon as you remember. If you are more than 12 hours late, skip the missed dose. Do not  take extra medicine to make up the missed dose. What happens if I overdose? Seek emergency medical attention or call the Poison Help line at 1-147.630.6288. Overdose symptoms may include rapid heartbeats, redness or warmth in your arms or legs, or fainting. What should I avoid while taking amlodipine? Avoid getting up too fast from a sitting or lying position, or you may feel dizzy. Get up slowly and steady yourself to prevent a fall. What are the possible side effects of amlodipine? Get emergency medical help if you have signs of an allergic reaction:  hives; difficulty breathing; swelling of your face, lips, tongue, or throat. In rare cases, when you first start taking amlodipine, your angina may get worse or you could have a heart attack.  Seek emergency medical attention or call your doctor right away if you have symptoms such as: chest pain or pressure, pain spreading to your jaw or shoulder, nausea, sweating. Call your doctor at once if you have:  · pounding heartbeats or fluttering in your chest;  · worsening chest pain;  · swelling in your feet or ankles;  · severe drowsiness; or  · a light-headed feeling, like you might pass out. Common side effects may include:  · dizziness;  · feeling tired;  · stomach pain, nausea; or  · flushing (warmth, redness, or tingly feeling). This is not a complete list of side effects and others may occur. Call your doctor for medical advice about side effects. You may report side effects to FDA at 4-139-ITF-4372. What other drugs will affect amlodipine? Tell your doctor about all your current medicines and any you start or stop using, especially:  · nitroglycerin;  · simvastatin (Zocor, Simcor, Vytorin); or  · any other heart or blood pressure medications. This list is not complete. Other drugs may interact with amlodipine, including prescription and over-the-counter medicines, vitamins, and herbal products. Not all possible interactions are listed in this medication guide. Where can I get more information? Your pharmacist can provide more information about amlodipine. Remember, keep this and all other medicines out of the reach of children, never share your medicines with others, and use this medication only for the indication prescribed. Every effort has been made to ensure that the information provided by [facility] [address] is accurate, up-to-date, and complete, but no guarantee is made to that effect. Drug information contained herein may be time sensitive. [facility] information has been compiled for use by healthcare practitioners and consumers in the [place] and therefore [employer] does not warrant that uses outside of the [place] are appropriate, unless specifically indicated otherwise. [facility]'s drug information does not endorse drugs, diagnose patients or recommend therapy.  [facility]'s drug information is an informational resource

## 2018-10-01 NOTE — PROGRESS NOTES
General FM note    Tuan Joy is a 50 y.o. female who presents today for follow up on her  medical conditions as noted below.   Tuan Joy is c/o of   Chief Complaint   Patient presents with    Blood Pressure Check     says blood pressure has been running high 160/90       Patient Active Problem List:     Hypothyroidism     Insulin resistance     Right knee pain     History of endometriosis     Proctalgia fugax     Multiple nevi     Edema of both legs     History of Osgood-Schlatter disease     Cough     Breast pain     Hypertrophy of breast     Pain in neck     Past Medical History:   Diagnosis Date    Gout     Hypothyroidism     Insulin resistance     Diagnosed in 2000      Past Surgical History:   Procedure Laterality Date    CYST REMOVAL  2000    FOOT SURGERY  10/2016    HYSTERECTOMY  12/2008    pt has right ovary    LAPAROSCOPY       Family History   Problem Relation Age of Onset    Cancer Mother     Diabetes Father     High Blood Pressure Father     Arthritis Father     Other Sister         BRAIN TUMOR    High Blood Pressure Maternal Grandmother     Cancer Maternal Grandfather         PROSTATE    Diabetes Maternal Grandfather     High Blood Pressure Paternal Grandmother      Current Outpatient Prescriptions   Medication Sig Dispense Refill    cyclobenzaprine (FLEXERIL) 5 MG tablet take 1 tablet by mouth twice a day if needed for muscle spasm 180 tablet 0    naproxen (NAPROSYN) 500 MG tablet Take 1 tablet by mouth 2 times daily (with meals) 180 tablet 0    amLODIPine (NORVASC) 5 MG tablet Take 0.5 tablets by mouth daily 15 tablet 1    fluticasone (FLONASE) 50 MCG/ACT nasal spray 1 spray by Nasal route daily 16 g 3    ranitidine (ZANTAC) 150 MG tablet Take 1 tablet by mouth 2 times daily 60 tablet 3    halobetasol (ULTRAVATE) 0.05 % ointment apply to affected area twice a day 50 g 3    cetirizine (ZYRTEC) 1 MG/ML syrup Take 5 mLs by mouth daily 350 mL 2    Multiple

## 2018-12-21 RX ORDER — METFORMIN HYDROCHLORIDE 1000 MG/1
TABLET, FILM COATED, EXTENDED RELEASE ORAL
Qty: 30 TABLET | Refills: 3 | Status: SHIPPED | OUTPATIENT
Start: 2018-12-21 | End: 2019-06-19 | Stop reason: SDUPTHER

## 2018-12-21 RX ORDER — AMLODIPINE BESYLATE 5 MG/1
TABLET ORAL
Qty: 15 TABLET | Refills: 1 | Status: SHIPPED | OUTPATIENT
Start: 2018-12-21 | End: 2019-04-03

## 2019-02-21 RX ORDER — FUROSEMIDE 40 MG/1
TABLET ORAL
Qty: 30 TABLET | Refills: 1 | Status: SHIPPED | OUTPATIENT
Start: 2019-02-21

## 2019-02-21 RX ORDER — HYDROCHLOROTHIAZIDE 25 MG/1
TABLET ORAL
Qty: 45 TABLET | Refills: 1 | Status: SHIPPED | OUTPATIENT
Start: 2019-02-21 | End: 2020-08-25

## 2019-04-03 ENCOUNTER — OFFICE VISIT (OUTPATIENT)
Dept: FAMILY MEDICINE CLINIC | Age: 49
End: 2019-04-03
Payer: COMMERCIAL

## 2019-04-03 VITALS
DIASTOLIC BLOOD PRESSURE: 73 MMHG | HEART RATE: 94 BPM | SYSTOLIC BLOOD PRESSURE: 119 MMHG | OXYGEN SATURATION: 96 % | TEMPERATURE: 98.8 F | WEIGHT: 250 LBS | BODY MASS INDEX: 38.01 KG/M2

## 2019-04-03 DIAGNOSIS — B34.9 VIRAL INFECTION: Primary | ICD-10-CM

## 2019-04-03 PROCEDURE — G8417 CALC BMI ABV UP PARAM F/U: HCPCS | Performed by: FAMILY MEDICINE

## 2019-04-03 PROCEDURE — G8427 DOCREV CUR MEDS BY ELIG CLIN: HCPCS | Performed by: FAMILY MEDICINE

## 2019-04-03 PROCEDURE — 1036F TOBACCO NON-USER: CPT | Performed by: FAMILY MEDICINE

## 2019-04-03 PROCEDURE — 99213 OFFICE O/P EST LOW 20 MIN: CPT | Performed by: FAMILY MEDICINE

## 2019-04-03 RX ORDER — AZITHROMYCIN 250 MG/1
TABLET, FILM COATED ORAL
Qty: 6 TABLET | Refills: 0 | Status: SHIPPED | OUTPATIENT
Start: 2019-04-03 | End: 2019-04-13

## 2019-04-03 ASSESSMENT — PATIENT HEALTH QUESTIONNAIRE - PHQ9
2. FEELING DOWN, DEPRESSED OR HOPELESS: 0
SUM OF ALL RESPONSES TO PHQ9 QUESTIONS 1 & 2: 0
SUM OF ALL RESPONSES TO PHQ QUESTIONS 1-9: 0
1. LITTLE INTEREST OR PLEASURE IN DOING THINGS: 0
SUM OF ALL RESPONSES TO PHQ QUESTIONS 1-9: 0

## 2019-04-03 NOTE — PROGRESS NOTES
Visit Information    Have you changed or started any medications since your last visit including any over-the-counter medicines, vitamins, or herbal medicines? no   Are you having any side effects from any of your medications? -  no  Have you stopped taking any of your medications? Is so, why? -  no    Have you seen any other physician or provider since your last visit? No  Have you had any other diagnostic tests since your last visit? No  Have you been seen in the emergency room and/or had an admission to a hospital since we last saw you? No  Have you had your routine dental cleaning in the past 6 months? no    Have you activated your [employer] account? If not, what are your barriers?  Yes     Patient Care Team:  Sandy Fields MD as PCP - General (Family Medicine)    Medical History Review  Past Medical, Family, and Social History reviewed and does not contribute to the patient presenting condition    Health Maintenance   Topic Date Due    HIV screen  08/19/1985    Cervical cancer screen  12/16/2018    TSH testing  05/03/2019    Potassium monitoring  05/03/2019    Creatinine monitoring  05/03/2019    Lipid screen  12/01/2022    DTaP/Tdap/Td vaccine (2 - Td) 08/04/2026    Flu vaccine  Completed

## 2019-04-03 NOTE — PROGRESS NOTES
Subjective:       Andrés Albercht is a 50 y.o. female who presents for evaluation of influenza like symptoms. Symptoms include chills, left ear pain, headache, myalgias, nasal blockage, productive cough and sinus and nasal congestion and have been present for several days. She has tried to alleviate the symptoms with acetaminophen and ibuprofen with minimal relief. High risk factors for influenza complications: none. Patient's medications, allergies, past medical, surgical, social and family histories were reviewed and updated as appropriate. Review of Systems  Pertinent items are noted in HPI. Objective:      /73   Pulse 94   Temp 98.8 °F (37.1 °C)   Wt 250 lb (113.4 kg)   SpO2 96%   BMI 38.01 kg/m²   General appearance: alert, appears stated age, cooperative and mild distress  Ears: normal TM's and external ear canals both ears and L Tm slightly irritated  Throat: lips, mucosa, and tongue normal; teeth and gums normal  Neck: moderate anterior cervical adenopathy, supple, symmetrical, trachea midline and thyroid not enlarged, symmetric, no tenderness/mass/nodules  Lungs: clear to auscultation bilaterally  Heart: regular rate and rhythm, S1, S2 normal, no murmur, click, rub or gallop      Assessment:      Influenza like syndrome      Plan:      Supportive care with appropriate antipyretics and fluids. Educational material distributed and questions answered. Call or return to clinic prn if these symptoms worsen or fail to improve as anticipated. I have reviewed the instructions with the patient, answering all questions to her satisfaction. AB as pocket script provided.

## 2019-06-18 ENCOUNTER — OFFICE VISIT (OUTPATIENT)
Dept: FAMILY MEDICINE CLINIC | Age: 49
End: 2019-06-18
Payer: COMMERCIAL

## 2019-06-18 VITALS
DIASTOLIC BLOOD PRESSURE: 89 MMHG | SYSTOLIC BLOOD PRESSURE: 124 MMHG | BODY MASS INDEX: 38.32 KG/M2 | WEIGHT: 252 LBS | OXYGEN SATURATION: 99 % | HEART RATE: 79 BPM

## 2019-06-18 DIAGNOSIS — E03.9 ACQUIRED HYPOTHYROIDISM: ICD-10-CM

## 2019-06-18 DIAGNOSIS — Z12.39 BREAST CANCER SCREENING: ICD-10-CM

## 2019-06-18 DIAGNOSIS — R92.8 ABNORMAL ULTRASOUND OF BREAST: ICD-10-CM

## 2019-06-18 DIAGNOSIS — E88.81 INSULIN RESISTANCE: ICD-10-CM

## 2019-06-18 DIAGNOSIS — E66.01 CLASS 2 SEVERE OBESITY DUE TO EXCESS CALORIES WITH SERIOUS COMORBIDITY AND BODY MASS INDEX (BMI) OF 38.0 TO 38.9 IN ADULT (HCC): ICD-10-CM

## 2019-06-18 DIAGNOSIS — Z00.01 ENCOUNTER FOR WELL ADULT EXAM WITH ABNORMAL FINDINGS: Primary | ICD-10-CM

## 2019-06-18 PROCEDURE — 1036F TOBACCO NON-USER: CPT | Performed by: FAMILY MEDICINE

## 2019-06-18 PROCEDURE — 99396 PREV VISIT EST AGE 40-64: CPT | Performed by: FAMILY MEDICINE

## 2019-06-18 PROCEDURE — 99213 OFFICE O/P EST LOW 20 MIN: CPT | Performed by: FAMILY MEDICINE

## 2019-06-18 PROCEDURE — G8427 DOCREV CUR MEDS BY ELIG CLIN: HCPCS | Performed by: FAMILY MEDICINE

## 2019-06-18 PROCEDURE — G8417 CALC BMI ABV UP PARAM F/U: HCPCS | Performed by: FAMILY MEDICINE

## 2019-06-18 NOTE — PROGRESS NOTES
Subjective:       Russel Caro is a 50 y.o. female and is here for a comprehensive physical exam.  The patient reports problems - wants to see an endo. Cannot loose weight. Even with exercising and a good healthy diet. She also tells me that she has a family history and she was diagnosed once with hypothyroidism. That so she wants to see an endocrinologist.  Last blood work last year did not show any changes in your thyroid function at all. Feels in that knee pains are gone but now she has been having elbow pains.  History:  Any STD's in the past? none  One ovar. Patient is status post hysterectomy.   Past Medical History:   Diagnosis Date    Gout     Hypothyroidism     Insulin resistance     Diagnosed in 2000     Patient Active Problem List    Diagnosis Date Noted    Breast pain 08/31/2015    Hypertrophy of breast 08/31/2015    Pain in neck 08/31/2015    Hypothyroidism 07/07/2014    Insulin resistance 07/07/2014    Right knee pain 07/07/2014    History of endometriosis 07/07/2014    Proctalgia fugax 07/07/2014    Multiple nevi 07/07/2014    Edema of both legs 07/07/2014    History of Osgood-Schlatter disease 07/07/2014     Past Surgical History:   Procedure Laterality Date    CYST REMOVAL  2000    FOOT SURGERY  10/2016    HYSTERECTOMY  12/2008    pt has right ovary    LAPAROSCOPY       Family History   Problem Relation Age of Onset    Cancer Mother     Diabetes Father     High Blood Pressure Father     Arthritis Father     Other Sister         BRAIN TUMOR    High Blood Pressure Maternal Grandmother     Cancer Maternal Grandfather         PROSTATE    Diabetes Maternal Grandfather     High Blood Pressure Paternal Grandmother      Social History     Socioeconomic History    Marital status: Single     Spouse name: Not on file    Number of children: Not on file    Years of education: Not on file    Highest education level: Not on file   Occupational History    Not on file mouth 2 times daily 60 tablet 3    etodolac (LODINE) 400 MG tablet take 1 tablet by mouth twice a day  0    halobetasol (ULTRAVATE) 0.05 % ointment apply to affected area twice a day 50 g 3    levothyroxine (SYNTHROID) 25 MCG tablet take 1 tablet by mouth once daily 90 tablet 0    cetirizine (ZYRTEC) 1 MG/ML syrup Take 5 mLs by mouth daily 350 mL 2    Multiple Vitamins-Minerals (THERAPEUTIC MULTIVITAMIN-MINERALS) tablet Take 1 tablet by mouth daily       No current facility-administered medications for this visit. Current Outpatient Medications on File Prior to Visit   Medication Sig Dispense Refill    hydrochlorothiazide (HYDRODIURIL) 25 MG tablet take 1/2 tablet by mouth once daily 45 tablet 1    furosemide (LASIX) 40 MG tablet take 1 tablet by mouth once daily 30 tablet 1    metFORMIN, MOD, (GLUMETZA) 1000 MG extended release tablet take 1 tablet by mouth every morning with food 30 tablet 3    cyclobenzaprine (FLEXERIL) 5 MG tablet take 1 tablet by mouth twice a day if needed for muscle spasm 180 tablet 0    naproxen (NAPROSYN) 500 MG tablet Take 1 tablet by mouth 2 times daily (with meals) 180 tablet 0    fluticasone (FLONASE) 50 MCG/ACT nasal spray 1 spray by Nasal route daily 16 g 3    ergocalciferol (ERGOCALCIFEROL) 00459 units capsule Take 1 capsule by mouth once a week 4 capsule 3    ranitidine (ZANTAC) 150 MG tablet Take 1 tablet by mouth 2 times daily 60 tablet 3    etodolac (LODINE) 400 MG tablet take 1 tablet by mouth twice a day  0    halobetasol (ULTRAVATE) 0.05 % ointment apply to affected area twice a day 50 g 3    levothyroxine (SYNTHROID) 25 MCG tablet take 1 tablet by mouth once daily 90 tablet 0    cetirizine (ZYRTEC) 1 MG/ML syrup Take 5 mLs by mouth daily 350 mL 2    Multiple Vitamins-Minerals (THERAPEUTIC MULTIVITAMIN-MINERALS) tablet Take 1 tablet by mouth daily       No current facility-administered medications on file prior to visit.       Allergies   Allergen Reactions    Banana Anaphylaxis and Hives    Ioxaglate Shortness Of Breath    Iv Contrast [Iodides] Shortness Of Breath    Molds & Smuts Anaphylaxis and Hives    Mushroom Extract Complex Anaphylaxis and Hives    Nut [Peanut-Containing Drug Products] Anaphylaxis    Peanut Oil Anaphylaxis and Hives    Bactrim [Sulfamethoxazole-Trimethoprim] Other (See Comments) and Swelling     Stomach swelling  GI DISTURBANCE    Roel Per [Benzoyl Peroxide] Swelling    Nickel        Do you take any herbs or supplements that were not prescribed by a doctor? no  Are you taking calcium supplements? not applicable  Are you taking aspirin daily? not applicable    Review of Systems  Do you have pain that bothers you in your daily life? no  Review of Systems   Constitutional: Negative for fever and unexpected weight change. HENT: Negative for ear pain, congestion, sore throat and rhinorrhea. Eyes: Negative for itching and visual disturbance. Respiratory: Negative for cough and shortness of breath. Cardiovascular: Negative for chest pain and leg swelling. Gastrointestinal: Negative for diarrhea, constipation and blood in stool. Endocrine: Negative for polydipsia and polyuria. Genitourinary: Negative for dysuria and hematuria. Musculoskeletal: Negative for back pain and gait problem. Positive for elbow pains. Skin: Negative for color change and rash. Neurological: Negative for dizziness and headaches. Psychiatric/Behavioral: Negative for confusion and agitation. Objective:   HENT:   /89   Pulse 79   Wt 252 lb (114.3 kg)   SpO2 99%   BMI 38.32 kg/m²   Constitutional: Alert and oriented. Well-nourished. No distress. Head: Normocephalic and atraumatic. Right Ear: External ear normal. TM: no bulging, erythema or fluid seen. Left Ear: External ear normal. TM: no bulging, erythema or fluid seen. Nose: Nose normal.   Mouth/Throat: Oropharynx is clear and moist. teeth in good repair.   Eyes: Pupils and side effects of prescribed medications. Barriers to medication compliance addressed. Patient given educational materials - see patient instructions  Was a self-tracking handout given in paper form or via [employer]? No: .    Requested Prescriptions      No prescriptions requested or ordered in this encounter       All patient questions answered. Patient voiced understanding. Quality Measures    Body mass index is 38.32 kg/m². Elevated. Weight control planned discussed daily exercise regimen and Healthy diet and regular exercise. BP: 124/89 Blood pressure is normal. Treatment plan consists of Weight Reduction, DASH Eating Plan, Dietary Sodium Restriction, Increased Physical Activity and No treatment change needed.     Lab Results   Component Value Date    LDLCHOLESTEROL 141 (H) 12/01/2017    (goal LDL reduction with dx if diabetes is 50% LDL reduction)      PHQ Scores 4/3/2019 12/28/2017 12/16/2015   PHQ2 Score 0 0 0   PHQ9 Score 0 0 0     Interpretation of Total Score Depression Severity: 1-4 = Minimal depression, 5-9 = Mild depression, 10-14 = Moderate depression, 15-19 = Moderately severe depression, 20-27 = Severe depression     Follow up in 6 months     (Please note that portions of this note were completed with a voice recognition program. Efforts were made to edit the dictations but occasionally words are mis-transcribed.)

## 2019-06-18 NOTE — PROGRESS NOTES
Visit Information    Have you changed or started any medications since your last visit including any over-the-counter medicines, vitamins, or herbal medicines? no   Are you having any side effects from any of your medications? -  no  Have you stopped taking any of your medications? Is so, why? -  no    Have you seen any other physician or provider since your last visit? No  Have you had any other diagnostic tests since your last visit? No  Have you been seen in the emergency room and/or had an admission to a hospital since we last saw you? No  Have you had your routine dental cleaning in the past 6 months? no    Have you activated your [employer] account? If not, what are your barriers?  Yes     Patient Care Team:  Keith Estrada MD as PCP - General (Family Medicine)  Keith Estrada MD as PCP - [facility]    Medical History Review  Past Medical, Family, and Social History reviewed and does not contribute to the patient presenting condition    Health Maintenance   Topic Date Due    HIV screen  08/19/1985    Cervical cancer screen  12/16/2018    TSH testing  05/03/2019    Potassium monitoring  05/03/2019    Creatinine monitoring  05/03/2019    Lipid screen  12/01/2022    DTaP/Tdap/Td vaccine (2 - Td) 08/04/2026    Flu vaccine  Completed    Pneumococcal 0-64 years Vaccine  Aged Out

## 2019-06-19 ENCOUNTER — HOSPITAL ENCOUNTER (OUTPATIENT)
Age: 49
Setting detail: SPECIMEN
Discharge: HOME | End: 2019-06-19
Payer: COMMERCIAL

## 2019-06-19 DIAGNOSIS — E66.01 CLASS 2 SEVERE OBESITY DUE TO EXCESS CALORIES WITH SERIOUS COMORBIDITY AND BODY MASS INDEX (BMI) OF 38.0 TO 38.9 IN ADULT (HCC): ICD-10-CM

## 2019-06-19 DIAGNOSIS — Z00.01 ENCOUNTER FOR WELL ADULT EXAM WITH ABNORMAL FINDINGS: ICD-10-CM

## 2019-06-19 DIAGNOSIS — E88.81 INSULIN RESISTANCE: ICD-10-CM

## 2019-06-19 LAB
-: ABNORMAL
ABSOLUTE EOS #: 0.09 K/UL (ref 0–0.44)
ABSOLUTE IMMATURE GRANULOCYTE: <0.03 K/UL (ref 0–0.3)
ABSOLUTE LYMPH #: 3.31 K/UL (ref 1.1–3.7)
ABSOLUTE MONO #: 0.41 K/UL (ref 0.1–1.2)
ALBUMIN SERPL-MCNC: 3.9 G/DL (ref 3.5–5.2)
ALBUMIN/GLOBULIN RATIO: 1 (ref 1–2.5)
ALP BLD-CCNC: 59 U/L (ref 35–104)
ALT SERPL-CCNC: 13 U/L (ref 5–33)
AMORPHOUS: ABNORMAL
ANION GAP SERPL CALCULATED.3IONS-SCNC: 22 MMOL/L (ref 9–17)
AST SERPL-CCNC: 14 U/L
BACTERIA: ABNORMAL
BASOPHILS # BLD: 1 % (ref 0–2)
BASOPHILS ABSOLUTE: 0.06 K/UL (ref 0–0.2)
BILIRUB SERPL-MCNC: 0.39 MG/DL (ref 0.3–1.2)
BILIRUBIN URINE: NEGATIVE
BUN BLDV-MCNC: 12 MG/DL (ref 6–20)
BUN/CREAT BLD: ABNORMAL (ref 9–20)
CALCIUM SERPL-MCNC: 9.3 MG/DL (ref 8.6–10.4)
CASTS UA: ABNORMAL /LPF (ref 0–8)
CHLORIDE BLD-SCNC: 99 MMOL/L (ref 98–107)
CHOLESTEROL/HDL RATIO: 5.2
CHOLESTEROL: 223 MG/DL
CO2: 22 MMOL/L (ref 20–31)
COLOR: YELLOW
COMMENT UA: ABNORMAL
CREAT SERPL-MCNC: 0.81 MG/DL (ref 0.5–0.9)
CRYSTALS, UA: ABNORMAL /HPF
DIFFERENTIAL TYPE: ABNORMAL
EOSINOPHILS RELATIVE PERCENT: 1 % (ref 1–4)
EPITHELIAL CELLS UA: ABNORMAL /HPF (ref 0–5)
GFR AFRICAN AMERICAN: >60 ML/MIN
GFR NON-AFRICAN AMERICAN: >60 ML/MIN
GFR SERPL CREATININE-BSD FRML MDRD: ABNORMAL ML/MIN/{1.73_M2}
GFR SERPL CREATININE-BSD FRML MDRD: ABNORMAL ML/MIN/{1.73_M2}
GLUCOSE BLD-MCNC: 91 MG/DL (ref 70–99)
GLUCOSE URINE: NEGATIVE
HCT VFR BLD CALC: 44.3 % (ref 36.3–47.1)
HDLC SERPL-MCNC: 43 MG/DL
HEMOGLOBIN: 13.6 G/DL (ref 11.9–15.1)
IMMATURE GRANULOCYTES: 0 %
INSULIN COMMENT: NORMAL
INSULIN REFERENCE RANGE:: NORMAL
INSULIN: 29.6 MU/L
KETONES, URINE: NEGATIVE
LDL CHOLESTEROL: 165 MG/DL (ref 0–130)
LEUKOCYTE ESTERASE, URINE: NEGATIVE
LYMPHOCYTES # BLD: 44 % (ref 24–43)
MCH RBC QN AUTO: 29.1 PG (ref 25.2–33.5)
MCHC RBC AUTO-ENTMCNC: 30.7 G/DL (ref 28.4–34.8)
MCV RBC AUTO: 94.7 FL (ref 82.6–102.9)
MONOCYTES # BLD: 5 % (ref 3–12)
MUCUS: ABNORMAL
NITRITE, URINE: NEGATIVE
NRBC AUTOMATED: 0 PER 100 WBC
OTHER OBSERVATIONS UA: ABNORMAL
PDW BLD-RTO: 12 % (ref 11.8–14.4)
PH UA: 6.5 (ref 5–8)
PLATELET # BLD: 453 K/UL (ref 138–453)
PLATELET ESTIMATE: ABNORMAL
PMV BLD AUTO: 8.8 FL (ref 8.1–13.5)
POTASSIUM SERPL-SCNC: 3.7 MMOL/L (ref 3.7–5.3)
PROTEIN UA: NEGATIVE
RBC # BLD: 4.68 M/UL (ref 3.95–5.11)
RBC # BLD: ABNORMAL 10*6/UL
RBC UA: ABNORMAL /HPF (ref 0–4)
RENAL EPITHELIAL, UA: ABNORMAL /HPF
SEG NEUTROPHILS: 49 % (ref 36–65)
SEGMENTED NEUTROPHILS ABSOLUTE COUNT: 3.65 K/UL (ref 1.5–8.1)
SODIUM BLD-SCNC: 143 MMOL/L (ref 135–144)
SPECIFIC GRAVITY UA: 1.02 (ref 1–1.03)
T3 FREE: 2.8 PG/ML (ref 2.02–4.43)
THYROXINE, FREE: 1.05 NG/DL (ref 0.93–1.7)
TOTAL PROTEIN: 7.8 G/DL (ref 6.4–8.3)
TRICHOMONAS: ABNORMAL
TRIGL SERPL-MCNC: 73 MG/DL
TSH SERPL DL<=0.05 MIU/L-ACNC: 0.82 MIU/L (ref 0.3–5)
TURBIDITY: CLEAR
URINE HGB: ABNORMAL
UROBILINOGEN, URINE: NORMAL
VLDLC SERPL CALC-MCNC: ABNORMAL MG/DL (ref 1–30)
WBC # BLD: 7.5 K/UL (ref 3.5–11.3)
WBC # BLD: ABNORMAL 10*3/UL
WBC UA: ABNORMAL /HPF (ref 0–5)
YEAST: ABNORMAL

## 2019-06-19 RX ORDER — METFORMIN HYDROCHLORIDE 1000 MG/1
TABLET, FILM COATED, EXTENDED RELEASE ORAL
Qty: 30 TABLET | Refills: 3 | Status: SHIPPED | OUTPATIENT
Start: 2019-06-19 | End: 2019-06-21 | Stop reason: ALTCHOICE

## 2019-06-20 DIAGNOSIS — R92.8 ABNORMAL ULTRASOUND OF BREAST: Primary | ICD-10-CM

## 2019-06-20 LAB — THYROID PEROXIDASE (TPO) AB: <10 IU/ML (ref 0–35)

## 2019-06-20 NOTE — TELEPHONE ENCOUNTER
Glucophage or Fordamet please advise. I pended Glucophage if that is what you want to consider.   Please advise on dosing as well

## 2019-06-21 RX ORDER — METFORMIN HYDROCHLORIDE 500 MG/1
1000 TABLET, EXTENDED RELEASE ORAL
Qty: 60 TABLET | Refills: 5 | Status: SHIPPED | OUTPATIENT
Start: 2019-06-21 | End: 2019-07-08 | Stop reason: SDUPTHER

## 2019-07-01 ENCOUNTER — HOSPITAL ENCOUNTER (OUTPATIENT)
Dept: ULTRASOUND IMAGING | Age: 49
Discharge: HOME | End: 2019-07-03
Payer: COMMERCIAL

## 2019-07-01 ENCOUNTER — HOSPITAL ENCOUNTER (OUTPATIENT)
Dept: MAMMOGRAPHY | Age: 49
Discharge: HOME | End: 2019-07-03
Payer: COMMERCIAL

## 2019-07-01 DIAGNOSIS — R92.8 ABNORMAL ULTRASOUND OF BREAST: ICD-10-CM

## 2019-07-01 DIAGNOSIS — Z00.01 ENCOUNTER FOR WELL ADULT EXAM WITH ABNORMAL FINDINGS: ICD-10-CM

## 2019-07-01 DIAGNOSIS — R92.8 ABNORMAL MAMMOGRAM: ICD-10-CM

## 2019-07-01 PROCEDURE — 76642 ULTRASOUND BREAST LIMITED: CPT

## 2019-07-01 PROCEDURE — G0279 TOMOSYNTHESIS, MAMMO: HCPCS

## 2019-07-08 ENCOUNTER — TELEPHONE (OUTPATIENT)
Dept: FAMILY MEDICINE CLINIC | Age: 49
End: 2019-07-08

## 2019-07-08 RX ORDER — METFORMIN HYDROCHLORIDE 500 MG/1
1000 TABLET, EXTENDED RELEASE ORAL
Qty: 60 TABLET | Refills: 5 | Status: SHIPPED | OUTPATIENT
Start: 2019-07-08

## 2019-08-12 RX ORDER — NAPROXEN 500 MG/1
TABLET ORAL
Qty: 180 TABLET | Refills: 0 | Status: SHIPPED | OUTPATIENT
Start: 2019-08-12 | End: 2020-11-18

## 2019-08-13 DIAGNOSIS — L30.9 ECZEMA: ICD-10-CM

## 2019-08-14 RX ORDER — HALOBETASOL PROPIONATE 0.05 %
OINTMENT (GRAM) TOPICAL
Qty: 60 G | Refills: 3 | Status: SHIPPED | OUTPATIENT
Start: 2019-08-14 | End: 2021-06-28

## 2019-09-23 DIAGNOSIS — K21.9 GASTROESOPHAGEAL REFLUX DISEASE, ESOPHAGITIS PRESENCE NOT SPECIFIED: ICD-10-CM

## 2019-09-24 RX ORDER — RANITIDINE 150 MG/1
TABLET ORAL
Qty: 60 TABLET | Refills: 3 | Status: SHIPPED | OUTPATIENT
Start: 2019-09-24

## 2020-08-25 RX ORDER — HYDROCHLOROTHIAZIDE 25 MG/1
TABLET ORAL
Qty: 45 TABLET | Refills: 1 | Status: SHIPPED | OUTPATIENT
Start: 2020-08-25

## 2020-08-27 ENCOUNTER — TELEMEDICINE (OUTPATIENT)
Dept: FAMILY MEDICINE CLINIC | Age: 50
End: 2020-08-27
Payer: COMMERCIAL

## 2020-08-27 ENCOUNTER — PATIENT MESSAGE (OUTPATIENT)
Dept: FAMILY MEDICINE CLINIC | Age: 50
End: 2020-08-27

## 2020-08-27 PROCEDURE — 99214 OFFICE O/P EST MOD 30 MIN: CPT | Performed by: FAMILY MEDICINE

## 2020-08-27 PROCEDURE — G8421 BMI NOT CALCULATED: HCPCS | Performed by: FAMILY MEDICINE

## 2020-08-27 PROCEDURE — G8427 DOCREV CUR MEDS BY ELIG CLIN: HCPCS | Performed by: FAMILY MEDICINE

## 2020-08-27 PROCEDURE — 3017F COLORECTAL CA SCREEN DOC REV: CPT | Performed by: FAMILY MEDICINE

## 2020-08-27 PROCEDURE — 1036F TOBACCO NON-USER: CPT | Performed by: FAMILY MEDICINE

## 2020-08-27 RX ORDER — VALSARTAN 160 MG/1
160 TABLET ORAL DAILY
Qty: 30 TABLET | Refills: 5 | Status: SHIPPED | OUTPATIENT
Start: 2020-08-27 | End: 2021-04-26

## 2020-08-27 RX ORDER — FLUCONAZOLE 150 MG/1
150 TABLET ORAL
Qty: 2 TABLET | Refills: 0 | Status: SHIPPED | OUTPATIENT
Start: 2020-08-27 | End: 2020-09-02

## 2020-08-27 ASSESSMENT — PATIENT HEALTH QUESTIONNAIRE - PHQ9
1. LITTLE INTEREST OR PLEASURE IN DOING THINGS: 0
SUM OF ALL RESPONSES TO PHQ QUESTIONS 1-9: 1
SUM OF ALL RESPONSES TO PHQ QUESTIONS 1-9: 1
SUM OF ALL RESPONSES TO PHQ9 QUESTIONS 1 & 2: 1
2. FEELING DOWN, DEPRESSED OR HOPELESS: 1

## 2020-08-27 NOTE — TELEPHONE ENCOUNTER
From: Franklin Lennon  To: Andi Alfaro MD  Sent: 8/27/2020 12:47 PM EDT  Subject: Visit Follow-Up Question    I don't see the labs that I supposed to get today on my my chart. Please advise.

## 2020-08-27 NOTE — PROGRESS NOTES
General FM note    Alvaro Allred is a 48 y.o. female who presents today for follow up on her  medical conditions as noted below. Alvaro Allred is c/o of No chief complaint on file.       Patient Active Problem List:     Hypothyroidism     Insulin resistance     Right knee pain     History of endometriosis     Proctalgia fugax     Multiple nevi     Edema of both legs     History of Osgood-Schlatter disease     Breast pain     Hypertrophy of breast     Pain in neck     Past Medical History:   Diagnosis Date    Gout     Hypothyroidism     Insulin resistance     Diagnosed in 2000      Past Surgical History:   Procedure Laterality Date    CYST REMOVAL  2000    FOOT SURGERY  10/2016    HYSTERECTOMY  12/2008    pt has right ovary    LAPAROSCOPY       Family History   Problem Relation Age of Onset    Cancer Mother     Diabetes Father     High Blood Pressure Father     Arthritis Father     Other Sister         BRAIN TUMOR    High Blood Pressure Maternal Grandmother     Cancer Maternal Grandfather         PROSTATE    Diabetes Maternal Grandfather     High Blood Pressure Paternal Grandmother      Current Outpatient Medications   Medication Sig Dispense Refill    valsartan (DIOVAN) 160 MG tablet Take 1 tablet by mouth daily 30 tablet 5    fluconazole (DIFLUCAN) 150 MG tablet Take 1 tablet by mouth every 72 hours for 6 days 2 tablet 0    hydroCHLOROthiazide (HYDRODIURIL) 25 MG tablet take 1/2 tablet by mouth once daily 45 tablet 1    ranitidine (ZANTAC) 150 MG tablet take 1 tablet by mouth twice a day 60 tablet 3    halobetasol (ULTRAVATE) 0.05 % ointment apply to affected area twice a day 60 g 3    naproxen (NAPROSYN) 500 MG tablet take 1 tablet by mouth twice a day with meals 180 tablet 0    metFORMIN (GLUCOPHAGE-XR) 500 MG extended release tablet Take 2 tablets by mouth daily (with breakfast) 60 tablet 5    furosemide (LASIX) 40 MG tablet take 1 tablet by mouth once daily 30 tablet 1    cyclobenzaprine (FLEXERIL) 5 MG tablet take 1 tablet by mouth twice a day if needed for muscle spasm 180 tablet 0    fluticasone (FLONASE) 50 MCG/ACT nasal spray 1 spray by Nasal route daily 16 g 3    ergocalciferol (ERGOCALCIFEROL) 82985 units capsule Take 1 capsule by mouth once a week 4 capsule 3    etodolac (LODINE) 400 MG tablet take 1 tablet by mouth twice a day  0    levothyroxine (SYNTHROID) 25 MCG tablet take 1 tablet by mouth once daily 90 tablet 0    cetirizine (ZYRTEC) 1 MG/ML syrup Take 5 mLs by mouth daily 350 mL 2    Multiple Vitamins-Minerals (THERAPEUTIC MULTIVITAMIN-MINERALS) tablet Take 1 tablet by mouth daily       No current facility-administered medications for this visit. ALLERGIES:    Allergies   Allergen Reactions    Banana Anaphylaxis and Hives    Ioxaglate Shortness Of Breath    Iv Contrast [Iodides] Shortness Of Breath    Molds & Smuts Anaphylaxis and Hives    Mushroom Extract Complex Anaphylaxis and Hives    Nut [Peanut-Containing Drug Products] Anaphylaxis    Peanut Oil Anaphylaxis and Hives    Bactrim [Sulfamethoxazole-Trimethoprim] Other (See Comments) and Swelling     Stomach swelling  GI DISTURBANCE    Roel Per [employer] Peroxide] Swelling    Nickel        Social History     Tobacco Use    Smoking status: Never Smoker    Smokeless tobacco: Never Used   Substance Use Topics    Alcohol use: No     Alcohol/week: 0.0 standard drinks      There is no height or weight on file to calculate BMI. There were no vitals taken for this visit. Subjective:      HPI    47 yo female reaching out per tele med visit today. I have not seen this pt for over a year. BP readings very high over the last days, --  192/131 on Friday  Today -- 161/80. Started taking 25 mg HCTZ. Also tells me that she has skin changes at the neck area and she feels that this could be \"herpes\".   Tells me that she had oral intercourse with her  ex and read about a infection which she could get from this action. Is very freaked out about all this. Wants to be tested for herpes. Review of Systems   Pertinent items are noted in HPI. Objective:   Physical Exam  General appearance: normal development, habitus and attention, no deformities. No distress. Pulmo: normal breathing pattern. Psychiatric: alert and oriented to place, time and person. Normal mood and affect. Skin: dark spots at the neck area. Assessment:       Diagnosis Orders   1. Essential hypertension  valsartan (DIOVAN) 160 MG tablet    CBC Auto Differential    TSH with Reflex    Comprehensive Metabolic Panel    Lipid Panel    Hemoglobin A1C   2. Other skin changes  Herpes Profile    Hemoglobin A1C   3. Eczema, unspecified type     4. Encounter for screening mammogram for malignant neoplasm of breast  TENA DIGITAL SCREEN W OR WO CAD BILATERAL   5. Hypothyroidism, unspecified type  TSH with Reflex   6. Need for hepatitis C screening test  Hepatitis C Antibody   7. Colon cancer screening  Cologuard       Plan:   I reassured the pt that the skin changes at the neck are probably not herpes infections. Looks more like a fungal infection the pt will be back in 2 weeks for a PE and BP check. Call or return to clinic prn if these symptoms worsen or fail to improve as anticipated. I have reviewed the instructions with the patient, answering all questions to her satisfaction. Shannen Lee is a 48 y.o. female being evaluated by a Virtual Visit (video visit) encounter to address concerns as mentioned above. A caregiver was present when appropriate. Due to this being a TeleHealth encounter (During [facility] public health emergency), evaluation of the following organ systems was limited: Vitals/Constitutional/EENT/Resp/CV/GI//MS/Neuro/Skin/Heme-Lymph-Imm.   Pursuant to the emergency declaration under the [address] [facility] [place], [address] waiver authority and the [employer] Appropriations Act, this Virtual Visit was conducted with patient's (and/or legal guardian's) consent, to reduce the patient's risk of exposure to COVID-19 and provide necessary medical care. The patient (and/or legal guardian) has also been advised to contact this office for worsening conditions or problems, and seek emergency medical treatment and/or call 911 if deemed necessary. Patient identification was verified at the start of the visit: Yes    Total time spent for this encounter: Not billed by time    Services were provided through a video synchronous discussion virtually to substitute for in-person clinic visit. Patient and provider were located at their individual homes. --Noreen Estrada MD on 8/27/2020 at 12:15 PM    An electronic signature was used to authenticate this note. Return in about 2 weeks (around 9/10/2020), or if symptoms worsen or fail to improve. Orders Placed This Encounter   Procedures    Cologuard     This test is performed by an external laboratory and is used for result attachment only. It is required that this order requisition be faxed to: [facility] @@ [phone]. See [email or web address] for further information. Standing Status:   Future     Standing Expiration Date:   12/27/2020    TENA DIGITAL SCREEN W OR WO CAD BILATERAL     Standing Status:   Future     Standing Expiration Date:   8/27/2021     Scheduling Instructions:      May perform additional studies at the discretion of the radiologist: Breast US, breast MRI, imaging guided biopsy, cyst aspiration or MBI.     Herpes Profile     Standing Status:   Future     Standing Expiration Date:   8/27/2021    CBC Auto Differential     Standing Status:   Future     Standing Expiration Date:   8/27/2021    TSH with Reflex     Standing Status:   Future     Standing Expiration Date:   8/28/2021    Comprehensive Metabolic Panel     Standing Status:   Future     Standing Expiration Date:   8/27/2021   [place] Hepatitis C Antibody     Standing Status:   Future     Standing Expiration Date:   8/27/2021    Lipid Panel     Standing Status:   Future     Standing Expiration Date:   8/27/2021     Order Specific Question:   Is Patient Fasting?/# of Hours     Answer:   yes    Hemoglobin A1C     Standing Status:   Future     Standing Expiration Date:   8/27/2021     Orders Placed This Encounter   Medications    valsartan (DIOVAN) 160 MG tablet     Sig: Take 1 tablet by mouth daily     Dispense:  30 tablet     Refill:  5    fluconazole (DIFLUCAN) 150 MG tablet     Sig: Take 1 tablet by mouth every 72 hours for 6 days     Dispense:  2 tablet     Refill:  0

## 2020-08-28 ENCOUNTER — HOSPITAL ENCOUNTER (OUTPATIENT)
Age: 50
Setting detail: SPECIMEN
Discharge: HOME | End: 2020-08-28
Payer: COMMERCIAL

## 2020-08-28 LAB
ABSOLUTE EOS #: 0.06 K/UL (ref 0–0.44)
ABSOLUTE IMMATURE GRANULOCYTE: <0.03 K/UL (ref 0–0.3)
ABSOLUTE LYMPH #: 2.38 K/UL (ref 1.1–3.7)
ABSOLUTE MONO #: 0.38 K/UL (ref 0.1–1.2)
ALBUMIN SERPL-MCNC: 3.8 G/DL (ref 3.5–5.2)
ALBUMIN/GLOBULIN RATIO: 1.2 (ref 1–2.5)
ALP BLD-CCNC: 51 U/L (ref 35–104)
ALT SERPL-CCNC: 10 U/L (ref 5–33)
ANION GAP SERPL CALCULATED.3IONS-SCNC: 12 MMOL/L (ref 9–17)
AST SERPL-CCNC: 14 U/L
BASOPHILS # BLD: 1 % (ref 0–2)
BASOPHILS ABSOLUTE: 0.06 K/UL (ref 0–0.2)
BILIRUB SERPL-MCNC: 0.4 MG/DL (ref 0.3–1.2)
BUN BLDV-MCNC: 10 MG/DL (ref 6–20)
BUN/CREAT BLD: NORMAL (ref 9–20)
CALCIUM SERPL-MCNC: 9.2 MG/DL (ref 8.6–10.4)
CHLORIDE BLD-SCNC: 102 MMOL/L (ref 98–107)
CHOLESTEROL/HDL RATIO: 3.9
CHOLESTEROL: 165 MG/DL
CO2: 25 MMOL/L (ref 20–31)
CREAT SERPL-MCNC: 0.79 MG/DL (ref 0.5–0.9)
DIFFERENTIAL TYPE: ABNORMAL
EOSINOPHILS RELATIVE PERCENT: 1 % (ref 1–4)
ESTIMATED AVERAGE GLUCOSE: 91 MG/DL
GFR AFRICAN AMERICAN: >60 ML/MIN
GFR NON-AFRICAN AMERICAN: >60 ML/MIN
GFR SERPL CREATININE-BSD FRML MDRD: NORMAL ML/MIN/{1.73_M2}
GFR SERPL CREATININE-BSD FRML MDRD: NORMAL ML/MIN/{1.73_M2}
GLUCOSE BLD-MCNC: 91 MG/DL (ref 70–99)
HBA1C MFR BLD: 4.8 % (ref 4–6)
HCT VFR BLD CALC: 41.3 % (ref 36.3–47.1)
HDLC SERPL-MCNC: 42 MG/DL
HEMOGLOBIN: 13.1 G/DL (ref 11.9–15.1)
HEPATITIS C ANTIBODY: NONREACTIVE
IMMATURE GRANULOCYTES: 0 %
LDL CHOLESTEROL: 110 MG/DL (ref 0–130)
LYMPHOCYTES # BLD: 35 % (ref 24–43)
MCH RBC QN AUTO: 29.4 PG (ref 25.2–33.5)
MCHC RBC AUTO-ENTMCNC: 31.7 G/DL (ref 28.4–34.8)
MCV RBC AUTO: 92.6 FL (ref 82.6–102.9)
MONOCYTES # BLD: 6 % (ref 3–12)
NRBC AUTOMATED: 0 PER 100 WBC
PDW BLD-RTO: 11.6 % (ref 11.8–14.4)
PLATELET # BLD: 470 K/UL (ref 138–453)
PLATELET ESTIMATE: ABNORMAL
PMV BLD AUTO: 8.7 FL (ref 8.1–13.5)
POTASSIUM SERPL-SCNC: 3.7 MMOL/L (ref 3.7–5.3)
RBC # BLD: 4.46 M/UL (ref 3.95–5.11)
RBC # BLD: ABNORMAL 10*6/UL
SARS-COV-2 ANTIBODY, TOTAL: NEGATIVE
SEG NEUTROPHILS: 57 % (ref 36–65)
SEGMENTED NEUTROPHILS ABSOLUTE COUNT: 3.96 K/UL (ref 1.5–8.1)
SODIUM BLD-SCNC: 139 MMOL/L (ref 135–144)
TOTAL PROTEIN: 7.1 G/DL (ref 6.4–8.3)
TRIGL SERPL-MCNC: 65 MG/DL
TSH SERPL DL<=0.05 MIU/L-ACNC: 0.94 MIU/L (ref 0.3–5)
VLDLC SERPL CALC-MCNC: NORMAL MG/DL (ref 1–30)
WBC # BLD: 6.9 K/UL (ref 3.5–11.3)
WBC # BLD: ABNORMAL 10*3/UL

## 2020-09-01 LAB
HERPES SIMPLEX VIRUS 1 IGG: 0.65
HERPES SIMPLEX VIRUS 2 IGG: 10.38
HERPES TYPE 1/2 IGM COMBINED: 0.76

## 2020-09-02 ENCOUNTER — TELEPHONE (OUTPATIENT)
Dept: FAMILY MEDICINE CLINIC | Age: 50
End: 2020-09-02

## 2020-09-29 ENCOUNTER — OFFICE VISIT (OUTPATIENT)
Dept: FAMILY MEDICINE CLINIC | Age: 50
End: 2020-09-29
Payer: COMMERCIAL

## 2020-09-29 VITALS
BODY MASS INDEX: 38.83 KG/M2 | WEIGHT: 256.2 LBS | HEART RATE: 79 BPM | SYSTOLIC BLOOD PRESSURE: 135 MMHG | HEIGHT: 68 IN | TEMPERATURE: 97.4 F | OXYGEN SATURATION: 96 % | DIASTOLIC BLOOD PRESSURE: 82 MMHG

## 2020-09-29 PROCEDURE — 3017F COLORECTAL CA SCREEN DOC REV: CPT | Performed by: FAMILY MEDICINE

## 2020-09-29 PROCEDURE — 1036F TOBACCO NON-USER: CPT | Performed by: FAMILY MEDICINE

## 2020-09-29 PROCEDURE — G8427 DOCREV CUR MEDS BY ELIG CLIN: HCPCS | Performed by: FAMILY MEDICINE

## 2020-09-29 PROCEDURE — 90686 IIV4 VACC NO PRSV 0.5 ML IM: CPT | Performed by: FAMILY MEDICINE

## 2020-09-29 PROCEDURE — 99213 OFFICE O/P EST LOW 20 MIN: CPT | Performed by: FAMILY MEDICINE

## 2020-09-29 PROCEDURE — G8417 CALC BMI ABV UP PARAM F/U: HCPCS | Performed by: FAMILY MEDICINE

## 2020-09-29 PROCEDURE — 90471 IMMUNIZATION ADMIN: CPT | Performed by: FAMILY MEDICINE

## 2020-09-29 RX ORDER — ZOSTER VACCINE RECOMBINANT, ADJUVANTED 50 MCG/0.5
0.5 KIT INTRAMUSCULAR SEE ADMIN INSTRUCTIONS
Qty: 0.5 ML | Refills: 1 | Status: SHIPPED | OUTPATIENT
Start: 2020-09-29 | End: 2021-03-28

## 2020-09-29 SDOH — ECONOMIC STABILITY: FOOD INSECURITY: WITHIN THE PAST 12 MONTHS, THE FOOD YOU BOUGHT JUST DIDN'T LAST AND YOU DIDN'T HAVE MONEY TO GET MORE.: NEVER TRUE

## 2020-09-29 SDOH — ECONOMIC STABILITY: INCOME INSECURITY: HOW HARD IS IT FOR YOU TO PAY FOR THE VERY BASICS LIKE FOOD, HOUSING, MEDICAL CARE, AND HEATING?: NOT HARD AT ALL

## 2020-09-29 SDOH — ECONOMIC STABILITY: FOOD INSECURITY: WITHIN THE PAST 12 MONTHS, YOU WORRIED THAT YOUR FOOD WOULD RUN OUT BEFORE YOU GOT MONEY TO BUY MORE.: NEVER TRUE

## 2020-09-29 ASSESSMENT — PATIENT HEALTH QUESTIONNAIRE - PHQ9
2. FEELING DOWN, DEPRESSED OR HOPELESS: 0
SUM OF ALL RESPONSES TO PHQ QUESTIONS 1-9: 0
SUM OF ALL RESPONSES TO PHQ9 QUESTIONS 1 & 2: 0
SUM OF ALL RESPONSES TO PHQ QUESTIONS 1-9: 0
1. LITTLE INTEREST OR PLEASURE IN DOING THINGS: 0

## 2020-09-29 NOTE — PROGRESS NOTES
General FM note    Cleve Reyes is a 48 y.o. female who presents today for follow up on her  medical conditions as noted below.   Cleve Reyes is c/o of   Chief Complaint   Patient presents with    Blood Pressure Check     3 wk       Patient Active Problem List:     Hypothyroidism     Insulin resistance     Right knee pain     History of endometriosis     Proctalgia fugax     Multiple nevi     Edema of both legs     History of Osgood-Schlatter disease     Breast pain     Hypertrophy of breast     Pain in neck     Past Medical History:   Diagnosis Date    Gout     Hypothyroidism     Insulin resistance     Diagnosed in 2000      Past Surgical History:   Procedure Laterality Date    CYST REMOVAL  2000    FOOT SURGERY  10/2016    HYSTERECTOMY  12/2008    pt has right ovary    LAPAROSCOPY       Family History   Problem Relation Age of Onset    Cancer Mother     Diabetes Father     High Blood Pressure Father     Arthritis Father     Other Sister         BRAIN TUMOR    High Blood Pressure Maternal Grandmother     Cancer Maternal Grandfather         PROSTATE    Diabetes Maternal Grandfather     High Blood Pressure Paternal Grandmother      Current Outpatient Medications   Medication Sig Dispense Refill    zoster recombinant adjuvanted vaccine (SHINGRIX) 50 MCG/0.5ML SUSR injection Inject 0.5 mLs into the muscle See Admin Instructions 1 dose now and repeat in 2-6 months 0.5 mL 1    valsartan (DIOVAN) 160 MG tablet Take 1 tablet by mouth daily 30 tablet 5    hydroCHLOROthiazide (HYDRODIURIL) 25 MG tablet take 1/2 tablet by mouth once daily 45 tablet 1    ranitidine (ZANTAC) 150 MG tablet take 1 tablet by mouth twice a day 60 tablet 3    halobetasol (ULTRAVATE) 0.05 % ointment apply to affected area twice a day 60 g 3    naproxen (NAPROSYN) 500 MG tablet take 1 tablet by mouth twice a day with meals 180 tablet 0    metFORMIN (GLUCOPHAGE-XR) 500 MG extended release tablet Take 2 tablets by mouth daily (with breakfast) (Patient taking differently: Take 1,000 mg by mouth daily (with breakfast) Once every other day.) 60 tablet 5    furosemide (LASIX) 40 MG tablet take 1 tablet by mouth once daily 30 tablet 1    cyclobenzaprine (FLEXERIL) 5 MG tablet take 1 tablet by mouth twice a day if needed for muscle spasm 180 tablet 0    fluticasone (FLONASE) 50 MCG/ACT nasal spray 1 spray by Nasal route daily 16 g 3    ergocalciferol (ERGOCALCIFEROL) 80496 units capsule Take 1 capsule by mouth once a week 4 capsule 3    etodolac (LODINE) 400 MG tablet take 1 tablet by mouth twice a day  0    levothyroxine (SYNTHROID) 25 MCG tablet take 1 tablet by mouth once daily 90 tablet 0    cetirizine (ZYRTEC) 1 MG/ML syrup Take 5 mLs by mouth daily 350 mL 2    Multiple Vitamins-Minerals (THERAPEUTIC MULTIVITAMIN-MINERALS) tablet Take 1 tablet by mouth daily       No current facility-administered medications for this visit. ALLERGIES:    Allergies   Allergen Reactions    Banana Anaphylaxis and Hives    Ioxaglate Shortness Of Breath    Iv Contrast [Iodides] Shortness Of Breath    Molds & Smuts Anaphylaxis and Hives    Mushroom Extract Complex Anaphylaxis and Hives    Nut [Peanut-Containing Drug Products] Anaphylaxis    Peanut Oil Anaphylaxis and Hives    Bactrim [Sulfamethoxazole-Trimethoprim] Other (See Comments) and Swelling     Stomach swelling  GI DISTURBANCE    Roel Per [employer] Peroxide] Swelling    Nickel        Social History     Tobacco Use    Smoking status: Never Smoker    Smokeless tobacco: Never Used   Substance Use Topics    Alcohol use: No     Alcohol/week: 0.0 standard drinks      Body mass index is 38.96 kg/m². /82   Pulse 79   Temp 97.4 °F (36.3 °C)   Ht 5' 8\" (1.727 m)   Wt 256 lb 3.2 oz (116.2 kg)   SpO2 96%   BMI 38.96 kg/m²     Subjective:      HPI    49-year-old female coming today for follow-up of her blood pressure.   The patient states that she did decrease the work stress quite a lot. And she feels that the current blood pressure medication has helped her quite a bit. Her blood pressure is well controlled today. She denies any chest pains palpitations. Again overall she feels much improved with less stress at work. She also would like to discuss the herpes profile where she was found to have an elevated IgG. Review of Systems   Constitutional: Negative for fever and unexpected weight change. Respiratory: Negative for cough and shortness of breath. Cardiovascular: Negative for chest pain and leg swelling. Gastrointestinal: Negative for diarrhea, constipation and blood in stool. Musculoskeletal: Negative for back pain and gait problem. Skin: Negative for color change and rash. Neurological: Negative for dizziness and headaches. Psychiatric/Behavioral: Negative for confusion and agitation. Objective:   Physical Exam  Constitutional: VS (see above). General appearance: normal development, habitus and attention, no deformities. No distress. Eyes: normal conjunctiva and lids. CAV: RRR, no RMG. No edema lower extremities. Pulmo: CTA bilateral, no CWR. Skin: no rashes, lesions or ulcers. Musculoskeletal: normal gait. Nails: no clubbing or cyanosis. Psychiatric: alert and oriented to place, time and person. Normal mood and affect. Assessment:       Diagnosis Orders   1. Essential hypertension     2. Need for influenza vaccination  INFLUENZA, QUADV, 3 YRS AND OLDER, IM PF, PREFILL SYR OR SDV, 0.5ML (AFLURIA QUADV, PF)   3. Need for shingles vaccine  zoster recombinant adjuvanted vaccine [facility]) 50 MCG/0.5ML SUSR injection       Plan:   Patient's blood pressure looks quite good. We will continue current care. Vaccination provided. I discussed with patient the herpes profile again. She will call if anything is needed. Call or return to clinic prn if these symptoms worsen or fail to improve as anticipated.   I have reviewed the instructions with the patient, answering all questions to her satisfaction. Return if symptoms worsen or fail to improve. Orders Placed This Encounter   Procedures    INFLUENZA, QUADV, 3 YRS AND OLDER, IM PF, PREFILL SYR OR SDV, 0.5ML (AFLURIA QUADV, PF)     Orders Placed This Encounter   Medications    zoster recombinant adjuvanted vaccine (SHINGRIX) 50 MCG/0.5ML SUSR injection     Sig: Inject 0.5 mLs into the muscle See Admin Instructions 1 dose now and repeat in 2-6 months     Dispense:  0.5 mL     Refill:  1       Anamika received counseling on the following healthy behaviors: nutrition, exercise and medication adherence  Reviewed prior labs and health maintenance. Continue current medications, diet and exercise. Discussed use, benefit, and side effects of prescribed medications. Barriers to medication compliance addressed. Patient given educational materials - see patient instructions. All patient questions answered. Patient voiced understanding.       Electronically signed by Pauline Fischer MD on 9/29/2020 at 5:27 PM       (Please note that portions of this note were completed with a voice recognition program. Efforts were made to edit the dictations but occasionally words are mis-transcribed.)

## 2020-11-18 RX ORDER — NAPROXEN 500 MG/1
TABLET ORAL
Qty: 180 TABLET | Refills: 0 | Status: SHIPPED | OUTPATIENT
Start: 2020-11-18 | End: 2021-11-22

## 2020-11-18 NOTE — TELEPHONE ENCOUNTER
Abbey Case is calling to request a refill on the following medication(s):    Last Visit Date (If Applicable):  9[birth year]/1931    Next Visit Date:    Visit date not found    Medication Request:  Requested Prescriptions     Pending Prescriptions Disp Refills    naproxen (NAPROSYN) 500 MG tablet [Pharmacy Med Name: NAPROXEN 500 MG TABLET] 180 tablet 0     Sig: take 1 tablet by mouth twice a day with meals

## 2021-04-25 DIAGNOSIS — I10 ESSENTIAL HYPERTENSION: ICD-10-CM

## 2021-04-26 RX ORDER — VALSARTAN 160 MG/1
TABLET ORAL
Qty: 30 TABLET | Refills: 5 | Status: SHIPPED | OUTPATIENT
Start: 2021-04-26

## 2021-05-04 DIAGNOSIS — M25.561 RIGHT KNEE PAIN, UNSPECIFIED CHRONICITY: Primary | ICD-10-CM

## 2021-05-05 ENCOUNTER — OFFICE VISIT (OUTPATIENT)
Dept: ORTHOPEDIC SURGERY | Age: 51
End: 2021-05-05
Payer: COMMERCIAL

## 2021-05-05 VITALS
HEIGHT: 68 IN | HEART RATE: 86 BPM | DIASTOLIC BLOOD PRESSURE: 78 MMHG | SYSTOLIC BLOOD PRESSURE: 130 MMHG | RESPIRATION RATE: 12 BRPM | WEIGHT: 261 LBS | BODY MASS INDEX: 39.56 KG/M2

## 2021-05-05 DIAGNOSIS — M17.0 BILATERAL PRIMARY OSTEOARTHRITIS OF KNEE: Primary | ICD-10-CM

## 2021-05-05 DIAGNOSIS — M25.562 LEFT KNEE PAIN, UNSPECIFIED CHRONICITY: Primary | ICD-10-CM

## 2021-05-05 PROCEDURE — 3017F COLORECTAL CA SCREEN DOC REV: CPT | Performed by: PHYSICIAN ASSISTANT

## 2021-05-05 PROCEDURE — G8417 CALC BMI ABV UP PARAM F/U: HCPCS | Performed by: PHYSICIAN ASSISTANT

## 2021-05-05 PROCEDURE — 99213 OFFICE O/P EST LOW 20 MIN: CPT | Performed by: PHYSICIAN ASSISTANT

## 2021-05-05 PROCEDURE — 20610 DRAIN/INJ JOINT/BURSA W/O US: CPT | Performed by: PHYSICIAN ASSISTANT

## 2021-05-05 PROCEDURE — G8427 DOCREV CUR MEDS BY ELIG CLIN: HCPCS | Performed by: PHYSICIAN ASSISTANT

## 2021-05-05 PROCEDURE — 1036F TOBACCO NON-USER: CPT | Performed by: PHYSICIAN ASSISTANT

## 2021-05-05 RX ORDER — METHYLPREDNISOLONE ACETATE 40 MG/ML
40 INJECTION, SUSPENSION INTRA-ARTICULAR; INTRALESIONAL; INTRAMUSCULAR; SOFT TISSUE ONCE
Status: COMPLETED | OUTPATIENT
Start: 2021-05-05 | End: 2021-05-05

## 2021-05-05 RX ORDER — BUPIVACAINE HYDROCHLORIDE 5 MG/ML
8 INJECTION, SOLUTION PERINEURAL ONCE
Status: COMPLETED | OUTPATIENT
Start: 2021-05-05 | End: 2021-05-05

## 2021-05-05 RX ADMIN — METHYLPREDNISOLONE ACETATE 40 MG: 40 INJECTION, SUSPENSION INTRA-ARTICULAR; INTRALESIONAL; INTRAMUSCULAR; SOFT TISSUE at 16:38

## 2021-05-05 RX ADMIN — BUPIVACAINE HYDROCHLORIDE 40 MG: 5 INJECTION, SOLUTION PERINEURAL at 16:37

## 2021-05-05 ASSESSMENT — ENCOUNTER SYMPTOMS
COLOR CHANGE: 0
CHEST TIGHTNESS: 0
DIARRHEA: 0
ABDOMINAL DISTENTION: 0
SHORTNESS OF BREATH: 0
ABDOMINAL PAIN: 0
APNEA: 0
VOMITING: 0
NAUSEA: 0
CONSTIPATION: 0
COUGH: 0

## 2021-05-05 NOTE — PROGRESS NOTES
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 The pain has been present for 3 years but it has been at its worse in the past 3 months. The patient recalls no specific injury. The patient has tried Aspercreme, Aleve, heat, ice, a brace, and repositioning with mild improvement. The pain is now described as Achy and Sharp. There is pain on weight bearing. The knee has swelled. There is painful popping and clicking. The knee has not caught or locked up. The knee has not given out. It is not stiff upon arising from sitting. It is painful to go up and down stairs and sit for a prolonged time. The patient has had a cortisone injection in October 2018 with excellent pain relief. The patient has not tried a lubrication injection. The patient has not tried physical therapy but she does home exercises. The patient has not had surgery. ROS:   Review of Systems   Constitutional: Positive for activity change. Negative for appetite change, fatigue and fever. Respiratory: Negative for apnea, cough, chest tightness and shortness of breath. Cardiovascular: Negative for chest pain, palpitations and leg swelling. Gastrointestinal: Negative for abdominal distention, abdominal pain, constipation, diarrhea, nausea and vomiting. Genitourinary: Negative for difficulty urinating, dysuria and hematuria. Musculoskeletal: Positive for arthralgias. Negative for gait problem, joint swelling and myalgias. Skin: Negative for color change and rash. Neurological: Negative for dizziness, weakness, numbness and headaches.    Psychiatric/Behavioral: Negative for sleep contrast [iodides], Molds & smuts, Mushroom extract complex, Nut [peanut-containing drug products], Peanut oil, Bactrim [sulfamethoxazole-trimethoprim], Roel per [employer] peroxide], and Nickel    Social History:   Social History     Socioeconomic History    Marital status: Single     Spouse name: None    Number of children: None    Years of education: None    Highest education level: None   Occupational History    None   Social Needs    Financial resource strain: Not hard at all   [employer] insecurity     Worry: Never true     Inability: Never true   [employer] needs     Medical: None     Non-medical: None   Tobacco Use    Smoking status: Never Smoker    Smokeless tobacco: Never Used   Substance and Sexual Activity    Alcohol use: No     Alcohol/week: 0.0 standard drinks    Drug use: No    Sexual activity: None   Lifestyle    Physical activity     Days per week: None     Minutes per session: None    Stress: None   Relationships    Social connections     Talks on phone: None     Gets together: None     Attends Alevism service: None     Active member of club or organization: None     Attends meetings of clubs or organizations: None     Relationship status: None    Intimate partner violence     Fear of current or ex partner: None     Emotionally abused: None     Physically abused: None     Forced sexual activity: None   Other Topics Concern    None   Social History Narrative    None     Family History:  Family History   Problem Relation Age of Onset    Cancer Mother     Diabetes Father     High Blood Pressure Father     Arthritis Father     Other Sister         BRAIN TUMOR    High Blood Pressure Maternal Grandmother     Cancer Maternal Grandfather         PROSTATE    Diabetes Maternal Grandfather     High Blood Pressure Paternal Grandmother      Vitals:   /78   Pulse 86   Resp 12   Ht 5' 8\" (1.727 m)   Wt 261 lb (118.4 kg)   BMI 39.68 kg/m²  Body mass index is 39.68 kg/m². Physical Examination:     Orthopedics:    GENERAL: Alert and oriented X3 in no acute distress. SKIN: Intact without lesions or ulcerations. NEURO: Intact to sensory and motor testing. VASC: Capillary refill is less than 3 seconds. KNEE EXAM    LOCATION: Bilateral Knee  GEN: Alert and oriented X 3, in no acute distress. GAIT: The patient's gait was observed while entering the exam room and was noted to be non antalgic. The extremity is in anatomic alignment. SKIN: Intact without rashes, lesions, or ulcerations. No obvious deformity or swelling. NEURO: The patient responds to light touch throughout bilateral LE. Patellar and Achilles reflexes are 2/4. VASC: The bilateral LE is neurovascularly intact with 2/4 DP and 2/4 PT pulses. Brisk capillary refill. ROM: 0/120 degrees. There is no effusion. MUSC: good quad tone  LIGAMENT: Lachman's test is Negative with Good endpoint. Anterior drawer Negative. Posterior drawer Negative. There is No varus instability at 0 degrees and No varus instability at 30 degrees. There is No valgus instability at 0 degrees and No valgus instability at 30 degrees. SPECIAL: Saurabh test is negative with no clunks and no pain but there is crepitation. PALP: There is no joint line pain. Assessment:   No diagnosis found. Procedures:    Procedure: yes    Regular Knee Injection    Location: Bilateral Knee  Procedure: I discussed in detail the risks, benefits and complications of the corticosteroid injection which included but are not limited to: infection, skin reactions, hot swollen, and anaphylaxis with the patient. Patrick Nolans verbalized understanding and they have agreed to have the corticosteroid injection into the bilateral knee. The patient was placed in the Supine position on the exam table. The anterior medial portal was identified and marked with a ball point pen. The skin was prepped with betadine in a sterile fashion.  Utilizing a [employer] ultrasound unit with a variable frequency linear transducer and clean technique with sterile gloves, a 5 cc solution containing 4 cc of .5 % Bupivocaine with 1 cc containing 40 mg of Depo-medrol  was injected. There was no resistance to the injection. The wound was cleansed and a band-aid was placed. the patient tolerated the procedure without difficulty. Adverse reactions to the injection were discussed with the patient including signs of infection (increasing pain, redness, swelling) and the patient was instructed to call immediately if experiencing any of these symptoms. Images of the injection site were recorded throughout the procedure and are saved on the SD card which is stored in the [employer] ultrasound unit. All images were downloaded and stored in patient's chart. Radiology:   KNEE X-RAY    4 views of the bilateral knee including AP, bilateral tunnel, and lateral in the upright position, and skyline views reveal anatomic alignment with no fracture or dislocation. Kellgren grade II/III changes of osteoarthritis (joint space narrowing, osteophyte, subchondral sclerosis, bony deformity/cyst) of the tricompartment(s). No osseous loose bodies. No bony erosion or periosteal reaction. No soft tissue masses. Spurring of the superior pole of the patella. Tibial tubercle spurring. Impression: Moderate osteoarthritis of the bilateral knee. Plan:   Treatment : I reviewed the X-ray with the patient and I informed her that the knees have moderate osteoarthritis which means she is at a kellgren grade II/III. We discussed the etiologies and natural histories of primary osteoarthritis of the bilateral knees. We discussed the various treatment alternatives including anti-inflammatory medications, physical therapy, injections, further imaging studies and as a last result surgery. During today's visit, the patient has opted for a cortisone injection into the joint capsule of the Bilateral knee to help reduce inflammation and pain.  The injection site should never get red, hot, or swollen and if it does the patient will contact our office right away. The patient may experience a increase in soreness the first 24-48 hours due to a cortisone flair and can take anti-inflammatories for a short period of time to reduce that soreness. The patient should not submerge the injection site in water for a minimum of 24 hours to avoid infection. This means no lakes, pools, ponds, or hot tubs for 24 hours. If the patient is diabetic the injection may increase their blood sugar for up to one week. If the injections stop working and do not give the patient relief the patient should consider surgical interventions to produce long term relief. Since her last injections worked for so long hopefully these will do it again. If it does not then we need to consider getting an MRI for further surgical planning. Patient should return to the clinic in 3 months to follow up with  Nicholas Torrez PA-C. The patient will call the office immediately with any problems. No orders of the defined types were placed in this encounter. No orders of the defined types were placed in this encounter. Bill Rayo V am scribing for and in the presence of Nicholas Torrez PA-C. 5/5/2021  4:20 PM    I, Nicholas Torrez PA-C, have personally seen this patient, reviewed the chart including history, and imaging if done. I personally  performed the physical exam and obtained any needed additional history. I placed orders, performed or supervised procedures and developed the treatment plan.     Electronically signed by Saundra Corley PA-C, on 5/6/2021 at 6:19 PM      Electronically signed by Asim Santos, on 5/5/2021 at 4:20 PM

## 2021-05-19 DIAGNOSIS — M25.572 ACUTE BILATERAL ANKLE PAIN: ICD-10-CM

## 2021-05-19 DIAGNOSIS — M25.571 ACUTE BILATERAL ANKLE PAIN: ICD-10-CM

## 2021-05-19 DIAGNOSIS — M79.671 BILATERAL FOOT PAIN: Primary | ICD-10-CM

## 2021-05-19 DIAGNOSIS — M79.672 BILATERAL FOOT PAIN: Primary | ICD-10-CM

## 2021-05-21 ENCOUNTER — OFFICE VISIT (OUTPATIENT)
Dept: ORTHOPEDIC SURGERY | Age: 51
End: 2021-05-21
Payer: COMMERCIAL

## 2021-05-21 VITALS
HEIGHT: 68 IN | BODY MASS INDEX: 39.56 KG/M2 | HEART RATE: 85 BPM | OXYGEN SATURATION: 98 % | WEIGHT: 261 LBS | RESPIRATION RATE: 12 BRPM

## 2021-05-21 DIAGNOSIS — M76.821 POSTERIOR TIBIAL TENDINITIS OF RIGHT LOWER EXTREMITY: ICD-10-CM

## 2021-05-21 DIAGNOSIS — M20.22 HALLUX RIGIDUS OF LEFT FOOT: ICD-10-CM

## 2021-05-21 DIAGNOSIS — M21.41 PES PLANUS OF BOTH FEET: Primary | ICD-10-CM

## 2021-05-21 DIAGNOSIS — M21.42 PES PLANUS OF BOTH FEET: Primary | ICD-10-CM

## 2021-05-21 DIAGNOSIS — M19.079 ARTHRITIS OF SUBTALAR JOINT: ICD-10-CM

## 2021-05-21 DIAGNOSIS — M24.573 EQUINUS CONTRACTURE OF ANKLE: ICD-10-CM

## 2021-05-21 PROCEDURE — 99214 OFFICE O/P EST MOD 30 MIN: CPT | Performed by: ORTHOPAEDIC SURGERY

## 2021-05-21 PROCEDURE — G8427 DOCREV CUR MEDS BY ELIG CLIN: HCPCS | Performed by: ORTHOPAEDIC SURGERY

## 2021-05-21 PROCEDURE — 3017F COLORECTAL CA SCREEN DOC REV: CPT | Performed by: ORTHOPAEDIC SURGERY

## 2021-05-21 PROCEDURE — G8417 CALC BMI ABV UP PARAM F/U: HCPCS | Performed by: ORTHOPAEDIC SURGERY

## 2021-05-21 PROCEDURE — 1036F TOBACCO NON-USER: CPT | Performed by: ORTHOPAEDIC SURGERY

## 2021-05-21 NOTE — LETTER
x-rays. At her next visit, depending on how she is doing, we may consider left first MTP joint injections versus surgery whenever she would like to proceed. I look forward to serving you and your patients again in the future. Please don't hesitate to contact me at my mobile number .         Alma Haas MD  Orthopedic Surgery

## 2021-05-21 NOTE — PROGRESS NOTES
[facility]  [facility] Deidra Gomez  [address] [facility] [address]  Dept: [phone]    Ambulatory Orthopedic Consult      CHIEF COMPLAINT:    Chief Complaint   Patient presents with    Foot Pain     Bilateral    Ankle Pain     Bilateral       HISTORY OF PRESENT ILLNESS:      The patient is a 48 y.o. female who is being seen for evaluation of the above, which began atraumatically over 25 years ago  . At today's visit, she is using no brace/assistive device. History is obtained today from:   [x]  the patient     [x]  EMR     []  one family member/friend    []  multiple family members/friends    []  other:      She is previously seen a podiatrist for this problem, and reports that she had surgery with Dr. Donna Gonzales in 2016 for a left foot first MTP cheilectomy. REVIEW OF SYSTEMS:  Constitutional: Negative for fever. HENT: Negative for tinnitus. Eyes: Negative for pain. Respiratory: Negative for shortness of breath. Cardiovascular: Negative for chest pain. Gastrointestinal: Negative for abdominal pain. Genitourinary: Negative for dysuria. Skin: Negative for rash. Neurological: Negative for headaches. Hematological: Does not bruise/bleed easily. Musculoskeletal: See HPI for pertinent positives     Past Medical History:    She  has a past medical history of Gout, Hypothyroidism, and Insulin resistance. Past Surgical History:    She  has a past surgical history that includes Hysterectomy (12/2008); cyst removal (2000); laparoscopy; and Foot surgery (10/2016).      Current Medications:     Current Outpatient Medications:     valsartan (DIOVAN) 160 MG tablet, take 1 tablet by mouth once daily, Disp: 30 tablet, Rfl: 5    naproxen (NAPROSYN) 500 MG tablet, take 1 tablet by mouth twice a day with meals, Disp: 180 tablet, Rfl: 0    hydroCHLOROthiazide (HYDRODIURIL) 25 MG tablet, take 1/2 tablet by mouth once daily, Disp: 45 tablet, Rfl: 1    ranitidine (ZANTAC) 150 MG tablet, take 1 tablet by mouth twice a day, Disp: 60 tablet, Rfl: 3    halobetasol (ULTRAVATE) 0.05 % ointment, apply to affected area twice a day, Disp: 60 g, Rfl: 3    metFORMIN (GLUCOPHAGE-XR) 500 MG extended release tablet, Take 2 tablets by mouth daily (with breakfast) (Patient taking differently: Take 1,000 mg by mouth daily (with breakfast) Once every other day.), Disp: 60 tablet, Rfl: 5    furosemide (LASIX) 40 MG tablet, take 1 tablet by mouth once daily, Disp: 30 tablet, Rfl: 1    cyclobenzaprine (FLEXERIL) 5 MG tablet, take 1 tablet by mouth twice a day if needed for muscle spasm, Disp: 180 tablet, Rfl: 0    fluticasone (FLONASE) 50 MCG/ACT nasal spray, 1 spray by Nasal route daily, Disp: 16 g, Rfl: 3    ergocalciferol (ERGOCALCIFEROL) 75307 units capsule, Take 1 capsule by mouth once a week, Disp: 4 capsule, Rfl: 3    etodolac (LODINE) 400 MG tablet, take 1 tablet by mouth twice a day, Disp: , Rfl: 0    levothyroxine (SYNTHROID) 25 MCG tablet, take 1 tablet by mouth once daily, Disp: 90 tablet, Rfl: 0    cetirizine (ZYRTEC) 1 MG/ML syrup, Take 5 mLs by mouth daily, Disp: 350 mL, Rfl: 2    Multiple Vitamins-Minerals (THERAPEUTIC MULTIVITAMIN-MINERALS) tablet, Take 1 tablet by mouth daily, Disp: , Rfl:      Allergies:    Banana, Ioxaglate, Iv contrast [iodides], Molds & smuts, Mushroom extract complex, Nut [peanut-containing drug products], Peanut oil, Bactrim [sulfamethoxazole-trimethoprim], Roel per [benzoyl peroxide], and Nickel    Family History:  family history includes Arthritis in her father; Cancer in her maternal grandfather and mother; Diabetes in her father and maternal grandfather; High Blood Pressure in her father, maternal grandmother, and paternal grandmother; Other in her sister.     Social History:   Social History     Occupational History    Not on file   Tobacco Use    Smoking status: Never Smoker    Smokeless tobacco: Never Used Substance and Sexual Activity    Alcohol use: No     Alcohol/week: 0.0 standard drinks    Drug use: No    Sexual activity: Not on file     Occupation:     OBJECTIVE:  Pulse 85   Resp 12   Ht 5' 8\" (1.727 m)   Wt 261 lb (118.4 kg)   SpO2 98%   BMI 39.68 kg/m²    Psych: alert and oriented to person, time, and place  Cardio:  well perfused extremities, no cyanosis  Resp:  normal respiratory effort  Musculoskeletal:    RLE:  Vascular: Limb well perfused, compartments soft/compressible. Skin: No erythema/ulcers. Intact. Neurovascular Status:  Grossly neurovascularly intact throughout   Tenderness to Palpation: Along the course the posterior tibial tendon, sinus Tarsi  -Unable to appropriately assess pain with resisted inversion due to significant tibialis anterior recruitment  -Equinus      LLE:  Vascular: Limb well perfused, compartments soft/compressible. Skin: No erythema/ulcers. Intact. Neurovascular Status:  Grossly neurovascularly intact throughout   Tenderness to Palpation: First MTP joint diffusely  --1st MTP:  Grind Test: Positive; Pain with Max Dorsiflexion: Positive, decreased first MTP motion  -Equinus        RADIOLOGY:   5/21/2021 FINDINGS:  Three weightbearing views (AP, Mortise, and Lateral) of the bilateral ankle and three weightbearing views (AP, Oblique, Lateral) of the bilateral foot were obtained in the office today and reviewed, revealing no acute fracture, dislocation, or radioopaque foreign body/tumor. On the left, there are significant degenerative changes at the first MTP joint with joint space narrowing, sclerosis, and osteophytes, and a possible loose body at the lateral aspect of the joint line. Meary's angle is apex plantar bilaterally, with widened talocalcaneal angle's and talar head uncoverage. Degenerative changes at the bilateral subtalar joints. IMPRESSION:  No acute fracture/dislocation.     Electronically signed by Chicho Hardin MD      Relevant previous imaging reviewed, both imaging and report(s) as below:    No results found. ASSESSMENT AND PLAN:  Body mass index is 39.68 kg/m². She has left greater than right foot pain, secondary to left foot hallux rigidus (status post first MTP cheilectomy in 2016 by Dr. Alistair Wells), and right foot posterior tibial tendon tendinitis with insufficiency and pes planus with mild subtalar joint arthritis. Notably, she has the past medical history as above. She has a history of paroxysmal atrial tachycardia, vitamin D deficiency, and insulin resistance. We had a discussion today about the likely diagnosis and its natural history, physical exam and imaging findings, as well as various treatment options in detail. Surgically, we discussed a possible future left foot first MTP joint fusion, with gastroc recession. We discussed the expected postoperative course, including the relevant weightbearing restrictions and immobilization. At today's visit, we decided to proceed with conservative management, as she does wish to avoid surgery for the time being. Orders/referrals were placed as below at today's visit. The patient was provided information on how to obtain an over-the-counter flatfoot style orthotic. She was also referred to prosthetics and orthotics to obtain bilateral custom flatfoot style orthotics, with a Ortega's extension on the left. The patient was also referred to physical therapy for my flatfoot protocol and hallux rigidus protocol. I provided a prescription for Voltaren (4g TOPL q QID PRN pain). All questions were answered and the above plan was agreed upon. The patient will return to clinic in 8 weeks without x-rays. At her next visit, depending on how she is doing, we may consider left first MTP joint injections versus surgery whenever she would like to proceed.             At the patient's next visit, depending on how the patient is doing and/or new imaging/labs results, we may consider the following options:    []  Lace up ankle     []  CAM boot         []  removable wrist brace     []  PT:        []  Wean out immobilization         []  Adv activity      []  Footmind        []  Spenco       []  Custom Orthotic:               []  AZ brace                    []  Rocker Bottom      []  Night splint    []  Heel cups        []  Strap        []  Toe gizmos    []  Topl        []  NSAIDs         []  Penny        []  Ref:         []  Stress Xray    []  CT        []  MRI  []  Inj:          []  Consider OR      []  Pick OR date    Return in about 8 weeks (around 7/16/2021). No orders of the defined types were placed in this encounter. Orders Placed This Encounter   Procedures    Ambulatory referral to Physical Therapy     Referral Priority:   Routine     Referral Type:   Eval and Treat     Referral Reason:   Specialty Services Required     Requested Specialty:   Physical Therapy     Number of Visits Requested:   1    Ambulatory referral to Physical Therapy     Referral Priority:   Routine     Referral Type:   Eval and Treat     Referral Reason:   Specialty Services Required     Requested Specialty:   Physical Therapy     Number of Visits Requested:   1    DME Order for Orthosis as OP     Eval and treat. Dx:  Pes planovalgus    Please provide orthotic with medial post and arch support and cuevas's extension (carbon fiber) for the left side, +/- right side    Chely Sandhu MD  Orthopedic Surgery, Foot and Ankle         Chely Sandhu MD  Orthopedic Surgery        Please excuse any typos/errors, as this note was created with the assistance of voice recognition software. While intending to generate a document that actually reflects the content of the visit, the document can still have some errors including those of syntax and sound-a-like substitutions which may escape proof reading. In such instances, actual meaning can be extrapolated by context.

## 2021-06-28 DIAGNOSIS — L30.9 ECZEMA: ICD-10-CM

## 2021-06-28 RX ORDER — HALOBETASOL PROPIONATE 0.05 %
OINTMENT (GRAM) TOPICAL
Qty: 50 G | Refills: 1 | Status: SHIPPED | OUTPATIENT
Start: 2021-06-28

## 2021-06-28 NOTE — TELEPHONE ENCOUNTER
Juan Jose Adame is calling to request a refill on the following medication(s):    Last Visit Date (If Applicable):  4/48/9341    Next Visit Date:    Visit date not found    Medication Request:  Requested Prescriptions     Pending Prescriptions Disp Refills    halobetasol (ULTRAVATE) 0.05 % ointment [Pharmacy Med Name: HALOBETASOL PROP 0.05% OINTMNT] 50 g      Sig: apply to affected area twice a day

## 2021-09-01 ENCOUNTER — OFFICE VISIT (OUTPATIENT)
Dept: FAMILY MEDICINE CLINIC | Age: 51
End: 2021-09-01
Payer: COMMERCIAL

## 2021-09-01 VITALS
OXYGEN SATURATION: 99 % | WEIGHT: 257.6 LBS | SYSTOLIC BLOOD PRESSURE: 131 MMHG | DIASTOLIC BLOOD PRESSURE: 75 MMHG | TEMPERATURE: 97.7 F | BODY MASS INDEX: 39.17 KG/M2 | HEART RATE: 86 BPM

## 2021-09-01 DIAGNOSIS — Z13.1 DIABETES MELLITUS SCREENING: ICD-10-CM

## 2021-09-01 DIAGNOSIS — I10 ESSENTIAL HYPERTENSION: Primary | ICD-10-CM

## 2021-09-01 DIAGNOSIS — R31.21 ASYMPTOMATIC MICROSCOPIC HEMATURIA: ICD-10-CM

## 2021-09-01 DIAGNOSIS — E66.09 CLASS 2 OBESITY DUE TO EXCESS CALORIES WITHOUT SERIOUS COMORBIDITY WITH BODY MASS INDEX (BMI) OF 39.0 TO 39.9 IN ADULT: ICD-10-CM

## 2021-09-01 DIAGNOSIS — E03.9 HYPOTHYROIDISM, UNSPECIFIED TYPE: ICD-10-CM

## 2021-09-01 DIAGNOSIS — Z23 NEED FOR SHINGLES VACCINE: ICD-10-CM

## 2021-09-01 DIAGNOSIS — Z12.31 ENCOUNTER FOR SCREENING MAMMOGRAM FOR MALIGNANT NEOPLASM OF BREAST: ICD-10-CM

## 2021-09-01 PROCEDURE — G8417 CALC BMI ABV UP PARAM F/U: HCPCS | Performed by: FAMILY MEDICINE

## 2021-09-01 PROCEDURE — 3017F COLORECTAL CA SCREEN DOC REV: CPT | Performed by: FAMILY MEDICINE

## 2021-09-01 PROCEDURE — G8427 DOCREV CUR MEDS BY ELIG CLIN: HCPCS | Performed by: FAMILY MEDICINE

## 2021-09-01 PROCEDURE — 1036F TOBACCO NON-USER: CPT | Performed by: FAMILY MEDICINE

## 2021-09-01 PROCEDURE — 99213 OFFICE O/P EST LOW 20 MIN: CPT | Performed by: FAMILY MEDICINE

## 2021-09-01 RX ORDER — CITALOPRAM 10 MG/1
TABLET ORAL
COMMUNITY
Start: 2021-08-18 | End: 2022-08-17

## 2021-09-01 RX ORDER — ATORVASTATIN CALCIUM 20 MG/1
TABLET, FILM COATED ORAL
COMMUNITY
Start: 2021-07-18

## 2021-09-01 RX ORDER — ZOSTER VACCINE RECOMBINANT, ADJUVANTED 50 MCG/0.5
0.5 KIT INTRAMUSCULAR SEE ADMIN INSTRUCTIONS
Qty: 0.5 ML | Refills: 1 | Status: SHIPPED | OUTPATIENT
Start: 2021-09-01 | End: 2022-02-28

## 2021-09-01 RX ORDER — ESCITALOPRAM OXALATE 10 MG/1
TABLET ORAL
COMMUNITY
Start: 2021-07-20 | End: 2022-06-24 | Stop reason: SDUPTHER

## 2021-09-01 ASSESSMENT — PATIENT HEALTH QUESTIONNAIRE - PHQ9
1. LITTLE INTEREST OR PLEASURE IN DOING THINGS: 0
SUM OF ALL RESPONSES TO PHQ QUESTIONS 1-9: 0
2. FEELING DOWN, DEPRESSED OR HOPELESS: 0
SUM OF ALL RESPONSES TO PHQ QUESTIONS 1-9: 0
SUM OF ALL RESPONSES TO PHQ QUESTIONS 1-9: 0
SUM OF ALL RESPONSES TO PHQ9 QUESTIONS 1 & 2: 0

## 2021-09-01 NOTE — PROGRESS NOTES
General FM note    Alanna Sherman is a 46 y.o. female who presents today for follow up on her  medical conditions as noted below.   Alanna Sherman is c/o of   Chief Complaint   Patient presents with    Pre-op Exam     Bariatric Surgery/December       Patient Active Problem List:     Hypothyroidism     Insulin resistance     Right knee pain     History of endometriosis     Proctalgia fugax     Multiple nevi     Edema of both legs     History of Osgood-Schlatter disease     Breast pain     Hypertrophy of breast     Pain in neck     Past Medical History:   Diagnosis Date    Gout     Hypothyroidism     Insulin resistance     Diagnosed in 2000      Past Surgical History:   Procedure Laterality Date    CYST REMOVAL  2000    FOOT SURGERY  10/2016    HYSTERECTOMY  12/2008    pt has right ovary    LAPAROSCOPY       Family History   Problem Relation Age of Onset    Cancer Mother     Diabetes Father     High Blood Pressure Father     Arthritis Father     Other Sister         BRAIN TUMOR    High Blood Pressure Maternal Grandmother     Cancer Maternal Grandfather         PROSTATE    Diabetes Maternal Grandfather     High Blood Pressure Paternal Grandmother      Current Outpatient Medications   Medication Sig Dispense Refill    citalopram (CELEXA) 10 MG tablet take 1 tablet by mouth once daily      atorvastatin (LIPITOR) 20 MG tablet take 1 tablet by mouth once daily      escitalopram (LEXAPRO) 10 MG tablet take 1/2 tablet by mouth once daily for 7 days then INCREASE to 1 tablet by mouth once daily      magnesium citrate solution Take 600 mLs by mouth once      zoster recombinant adjuvanted vaccine (SHINGRIX) 50 MCG/0.5ML SUSR injection Inject 0.5 mLs into the muscle See Admin Instructions 1 dose now and repeat in 2-6 months 0.5 mL 1    halobetasol (ULTRAVATE) 0.05 % ointment apply to affected area twice a day 50 g 1    diclofenac sodium (VOLTAREN) 1 % GEL Apply 2 g topically 2 times daily 100 g 0    valsartan (DIOVAN) 160 MG tablet take 1 tablet by mouth once daily 30 tablet 5    naproxen (NAPROSYN) 500 MG tablet take 1 tablet by mouth twice a day with meals 180 tablet 0    hydroCHLOROthiazide (HYDRODIURIL) 25 MG tablet take 1/2 tablet by mouth once daily 45 tablet 1    ranitidine (ZANTAC) 150 MG tablet take 1 tablet by mouth twice a day 60 tablet 3    metFORMIN (GLUCOPHAGE-XR) 500 MG extended release tablet Take 2 tablets by mouth daily (with breakfast) (Patient taking differently: Take 1,000 mg by mouth daily (with breakfast) Once every other day.) 60 tablet 5    furosemide (LASIX) 40 MG tablet take 1 tablet by mouth once daily 30 tablet 1    cyclobenzaprine (FLEXERIL) 5 MG tablet take 1 tablet by mouth twice a day if needed for muscle spasm 180 tablet 0    fluticasone (FLONASE) 50 MCG/ACT nasal spray 1 spray by Nasal route daily 16 g 3    ergocalciferol (ERGOCALCIFEROL) 47074 units capsule Take 1 capsule by mouth once a week 4 capsule 3    etodolac (LODINE) 400 MG tablet take 1 tablet by mouth twice a day  0    levothyroxine (SYNTHROID) 25 MCG tablet take 1 tablet by mouth once daily 90 tablet 0    cetirizine (ZYRTEC) 1 MG/ML syrup Take 5 mLs by mouth daily 350 mL 2    Multiple Vitamins-Minerals (THERAPEUTIC MULTIVITAMIN-MINERALS) tablet Take 1 tablet by mouth daily       No current facility-administered medications for this visit.      ALLERGIES:    Allergies   Allergen Reactions    Banana Anaphylaxis and Hives    Ioxaglate Shortness Of Breath    Iv Contrast [Iodides] Shortness Of Breath    Molds & Smuts Anaphylaxis and Hives    Mushroom Extract Complex Anaphylaxis and Hives    Nut [Peanut-Containing Drug Products] Anaphylaxis    Peanut Oil Anaphylaxis and Hives    Bactrim [Sulfamethoxazole-Trimethoprim] Other (See Comments) and Swelling     Stomach swelling  GI DISTURBANCE    Roel Per [Benzoyl Peroxide] Swelling    Nickel        Social History     Tobacco Use    Smoking status: Never Smoker    Smokeless tobacco: Never Used   Substance Use Topics    Alcohol use: No     Alcohol/week: 0.0 standard drinks      Body mass index is 39.17 kg/m². /75   Pulse 86   Temp 97.7 °F (36.5 °C)   Wt 257 lb 9.6 oz (116.8 kg)   SpO2 99%   BMI 39.17 kg/m²     Subjective:      HPI    46 y.o. female here for a med clearance for sleeve surgery in 12/2021. Patient states that she currently is working on the hernia diet and exercises. She feels that the specialist in [facility] is very very organized. She has a good feeling going there. She states that she had surgeries in the past without complications. No recent bleedings. No loose teeth or bridges. She denies any cervical pain or any TMJ pains. Blood pressure well controlled. Denies any chest pains palpitations. Would like to have additional blood work done especially checking her A1c. Fasting glucose level was 92. Review of Systems   Constitutional: Negative for fever and unexpected weight change. Pertinent items are noted in HPI. Objective:   Physical Exam  Constitutional: VS (see above). General appearance: normal development, habitus and attention, no deformities. No distress. Eyes: normal conjunctiva and lids. CAV: RRR, no RMG. No edema lower extremities. Pulmo: CTA bilateral, no CWR. Skin: no rashes, lesions or ulcers. Musculoskeletal: normal gait. Nails: no clubbing or cyanosis. Psychiatric: alert and oriented to place, time and person. Normal mood and affect. Assessment:       Diagnosis Orders   1. Essential hypertension  Lipid Panel   2. Encounter for screening mammogram for malignant neoplasm of breast  TENA DIGITAL SCREEN W OR WO CAD BILATERAL   3. Hypothyroidism, unspecified type     4. Need for shingles vaccine  zoster recombinant adjuvanted vaccine [facility]) 50 MCG/0.5ML SUSR injection   5. Diabetes mellitus screening  Hemoglobin A1C    Insulin, total   6. Asymptomatic microscopic hematuria  Urinalysis   7. Class 2 obesity due to excess calories without serious comorbidity with body mass index (BMI) of 39.0 to 39.9 in adult         Plan:   The patient is doing quite well. I did review with her her recent blood work. Additional blood work ordered. Discussed with her the shingles vaccine as well as the mammogram which she will get before the surgery. No follow-ups on file. Orders Placed This Encounter   Procedures    TENA DIGITAL SCREEN W OR WO CAD BILATERAL     Standing Status:   Future     Standing Expiration Date:   11/1/2022     Scheduling Instructions:      May perform additional studies at the discretion of the radiologist: Breast US, breast MRI, imaging guided biopsy, cyst aspiration or MBI.  Hemoglobin A1C     Standing Status:   Future     Standing Expiration Date:   9/1/2022    Insulin, total     Standing Status:   Future     Standing Expiration Date:   9/1/2022    Lipid Panel     Standing Status:   Future     Standing Expiration Date:   9/1/2022     Order Specific Question:   Is Patient Fasting?/# of Hours     Answer:   yes    Urinalysis     Standing Status:   Future     Standing Expiration Date:   9/1/2022     Order Specific Question:   SPECIFY(EX-CATH,MIDSTREAM,CYSTO,ETC)? Answer:   midstream     Orders Placed This Encounter   Medications    zoster recombinant adjuvanted vaccine (SHINGRIX) 50 MCG/0.5ML SUSR injection     Sig: Inject 0.5 mLs into the muscle See Admin Instructions 1 dose now and repeat in 2-6 months     Dispense:  0.5 mL     Refill:  1       Call or return to clinic prn if these symptoms worsen or fail to improve as anticipated. I have reviewed the instructions with the patient, answering all questions to patient's satisfaction. Anamika received counseling on the following healthy behaviors: nutrition, exercise, and medication adherence  Reviewed prior labs and health maintenance. Continue current medications, diet and exercise.   Discussed use, benefit, and side effects of prescribed medications. Barriers to medication compliance addressed. Patient given educational materials - see patient instructions. All patient questions answered. Patient voiced understanding.       Electronically signed by Ele Hawkins MD on 9/2/2021 at 6:09 AM       (Please note that portions of this note were completed with a voice recognition program. Efforts were made to edit the dictations but occasionally words are mis-transcribed.)

## 2021-09-05 LAB
APPEARANCE: ABNORMAL
BILIRUBIN, URINE: NORMAL
BILIRUBIN: NEGATIVE
BLOOD, URINE: NORMAL
CHOLESTEROL, TOTAL: 157 MG/DL
CHOLESTEROL/HDL RATIO: 3.7 (ref 1–5)
CHOLESTEROL/HDL RATIO: NORMAL
CHOLESTEROL: 157 MG/DL (ref 150–200)
CLARITY: NORMAL
COLOR: NORMAL
COLOR: YELLOW
GLUCOSE BLD-MCNC: NEGATIVE MG/DL
GLUCOSE URINE: NORMAL
HDLC SERPL-MCNC: 42 MG/DL
HDLC SERPL-MCNC: NORMAL MG/DL
INSULIN: 10.05 UIU/ML (ref 1–23)
KETONES, URINE: NEGATIVE MG/DL
KETONES, URINE: NORMAL
LDL CHOLESTEROL CALCULATED: 103 MG/DL
LDL CHOLESTEROL CALCULATED: NORMAL
LDL/HDL RATIO: 2.5
LEUKOCYTE ESTERASE, URINE: NEGATIVE
LEUKOCYTE ESTERASE, URINE: NORMAL
NITRITE, URINE: NEGATIVE
NITRITE, URINE: NORMAL
NONHDLC SERPL-MCNC: NORMAL MG/DL
OCCULT BLOOD,URINE: ABNORMAL
OTHER MICROSCOPIC ELEMENTS: ABNORMAL
PH UA: 6.5 (ref 4.5–8)
PH: 6.5 (ref 5–8.5)
PROTEIN UA: NORMAL
PROTEIN, URINE: 30 MG/DL
RBC: 12 /HPF (ref 0–5)
SP GRAVITY MISCELLANEOUS: 1.02 (ref 1–1.03)
SPECIFIC GRAVITY, URINE: NORMAL
TRIGL SERPL-MCNC: 62 MG/DL (ref 27–150)
TRIGL SERPL-MCNC: NORMAL MG/DL
UROBILINOGEN, URINE: <1.1 EU/DL
UROBILINOGEN, URINE: NORMAL
VLDLC SERPL CALC-MCNC: 12 MG/DL (ref 0–30)
VLDLC SERPL CALC-MCNC: NORMAL MG/DL
WBC: 5 /HPF (ref 0–5)

## 2021-09-07 DIAGNOSIS — R31.21 ASYMPTOMATIC MICROSCOPIC HEMATURIA: ICD-10-CM

## 2021-09-07 DIAGNOSIS — Z13.1 DIABETES MELLITUS SCREENING: ICD-10-CM

## 2021-09-07 DIAGNOSIS — I10 ESSENTIAL HYPERTENSION: ICD-10-CM

## 2021-09-28 ENCOUNTER — OFFICE VISIT (OUTPATIENT)
Dept: ORTHOPEDIC SURGERY | Age: 51
End: 2021-09-28
Payer: COMMERCIAL

## 2021-09-28 VITALS — HEIGHT: 68 IN | BODY MASS INDEX: 38.95 KG/M2 | WEIGHT: 257 LBS | RESPIRATION RATE: 12 BRPM | TEMPERATURE: 98.2 F

## 2021-09-28 DIAGNOSIS — M76.62 ACHILLES TENDINITIS OF LEFT LOWER EXTREMITY: Primary | ICD-10-CM

## 2021-09-28 DIAGNOSIS — S86.012A STRAIN OF LEFT ACHILLES TENDON, INITIAL ENCOUNTER: ICD-10-CM

## 2021-09-28 PROCEDURE — G8417 CALC BMI ABV UP PARAM F/U: HCPCS | Performed by: ORTHOPAEDIC SURGERY

## 2021-09-28 PROCEDURE — G8427 DOCREV CUR MEDS BY ELIG CLIN: HCPCS | Performed by: ORTHOPAEDIC SURGERY

## 2021-09-28 PROCEDURE — 99214 OFFICE O/P EST MOD 30 MIN: CPT | Performed by: ORTHOPAEDIC SURGERY

## 2021-09-28 PROCEDURE — 3017F COLORECTAL CA SCREEN DOC REV: CPT | Performed by: ORTHOPAEDIC SURGERY

## 2021-09-28 PROCEDURE — 1036F TOBACCO NON-USER: CPT | Performed by: ORTHOPAEDIC SURGERY

## 2021-09-28 NOTE — PROGRESS NOTES
[facility]  [facility] [place]  [address] [address]  Dept: [phone]    Ambulatory Orthopedic Consult      CHIEF COMPLAINT:    Chief Complaint   Patient presents with    Ankle Pain     left       HISTORY OF PRESENT ILLNESS:      The patient is a 46 y.o. female who is being seen for evaluation of the above, which began atraumatically over 25 years ago  . At today's visit, she is using no brace/assistive device. History is obtained today from:   [x]  the patient     [x]  EMR     []  one family member/friend    []  multiple family members/friends    []  other:      She is previously seen a podiatrist for this problem, and reports that she had surgery with Dr. Rigoberto Stanford in 2016 for a left foot first MTP cheilectomy. INTERVAL HISTORY 9/28/2021:  She is seen again today in the office for evaluation of a new issue as above, which began 9/27/2021 atraumatically  . At today's visit, she is using no brace/assistive device. History is obtained today from:   [x]  the patient     []  EMR     []  one family member/friend    []  multiple family members/friends    []  other:      She localizes her pain to her left Achilles tendon. REVIEW OF SYSTEMS:  Constitutional: Negative for fever. HENT: Negative for tinnitus. Eyes: Negative for pain. Respiratory: Negative for shortness of breath. Cardiovascular: Negative for chest pain. Gastrointestinal: Negative for abdominal pain. Genitourinary: Negative for dysuria. Skin: Negative for rash. Neurological: Negative for headaches. Hematological: Does not bruise/bleed easily. Musculoskeletal: See HPI for pertinent positives     Past Medical History:    She  has a past medical history of Gout, Hypothyroidism, and Insulin resistance.      Past Surgical History:    She  has a past surgical history that includes Hysterectomy (12/2008); cyst removal (2000); laparoscopy; and Foot surgery (10/2016).      Current Medications:     Current Outpatient Medications:     citalopram (CELEXA) 10 MG tablet, take 1 tablet by mouth once daily, Disp: , Rfl:     atorvastatin (LIPITOR) 20 MG tablet, take 1 tablet by mouth once daily, Disp: , Rfl:     escitalopram (LEXAPRO) 10 MG tablet, take 1/2 tablet by mouth once daily for 7 days then INCREASE to 1 tablet by mouth once daily, Disp: , Rfl:     magnesium citrate solution, Take 600 mLs by mouth once, Disp: , Rfl:     zoster recombinant adjuvanted vaccine (SHINGRIX) 50 MCG/0.5ML SUSR injection, Inject 0.5 mLs into the muscle See Admin Instructions 1 dose now and repeat in 2-6 months, Disp: 0.5 mL, Rfl: 1    halobetasol (ULTRAVATE) 0.05 % ointment, apply to affected area twice a day, Disp: 50 g, Rfl: 1    diclofenac sodium (VOLTAREN) 1 % GEL, Apply 2 g topically 2 times daily, Disp: 100 g, Rfl: 0    valsartan (DIOVAN) 160 MG tablet, take 1 tablet by mouth once daily, Disp: 30 tablet, Rfl: 5    naproxen (NAPROSYN) 500 MG tablet, take 1 tablet by mouth twice a day with meals, Disp: 180 tablet, Rfl: 0    hydroCHLOROthiazide (HYDRODIURIL) 25 MG tablet, take 1/2 tablet by mouth once daily, Disp: 45 tablet, Rfl: 1    ranitidine (ZANTAC) 150 MG tablet, take 1 tablet by mouth twice a day, Disp: 60 tablet, Rfl: 3    metFORMIN (GLUCOPHAGE-XR) 500 MG extended release tablet, Take 2 tablets by mouth daily (with breakfast) (Patient taking differently: Take 1,000 mg by mouth daily (with breakfast) Once every other day.), Disp: 60 tablet, Rfl: 5    furosemide (LASIX) 40 MG tablet, take 1 tablet by mouth once daily, Disp: 30 tablet, Rfl: 1    cyclobenzaprine (FLEXERIL) 5 MG tablet, take 1 tablet by mouth twice a day if needed for muscle spasm, Disp: 180 tablet, Rfl: 0    fluticasone (FLONASE) 50 MCG/ACT nasal spray, 1 spray by Nasal route daily, Disp: 16 g, Rfl: 3    ergocalciferol (ERGOCALCIFEROL) 89729 units capsule, Take 1 capsule by mouth once a week, Disp: 4 capsule, Rfl: 3    etodolac (LODINE) 400 MG tablet, take 1 tablet by mouth twice a day, Disp: , Rfl: 0    levothyroxine (SYNTHROID) 25 MCG tablet, take 1 tablet by mouth once daily, Disp: 90 tablet, Rfl: 0    cetirizine (ZYRTEC) 1 MG/ML syrup, Take 5 mLs by mouth daily, Disp: 350 mL, Rfl: 2    Multiple Vitamins-Minerals (THERAPEUTIC MULTIVITAMIN-MINERALS) tablet, Take 1 tablet by mouth daily, Disp: , Rfl:      Allergies:    Banana, Ioxaglate, Iv contrast [iodides], Molds & smuts, Mushroom extract complex, Nut [peanut-containing drug products], Peanut oil, Bactrim [sulfamethoxazole-trimethoprim], Roel per [benzoyl peroxide], and Nickel    Family History:  family history includes Arthritis in her father; Cancer in her maternal grandfather and mother; Diabetes in her father and maternal grandfather; High Blood Pressure in her father, maternal grandmother, and paternal grandmother; Other in her sister. Social History:   Social History     Occupational History    Not on file   Tobacco Use    Smoking status: Never Smoker    Smokeless tobacco: Never Used   Substance and Sexual Activity    Alcohol use: No     Alcohol/week: 0.0 standard drinks    Drug use: No    Sexual activity: Not on file     Occupation:     OBJECTIVE:  Temp 98.2 °F (36.8 °C)   Resp 12   Ht 5' 8\" (1.727 m)   Wt 257 lb (116.6 kg)   BMI 39.08 kg/m²    Psych: alert and oriented to person, time, and place  Cardio:  well perfused extremities, no cyanosis  Resp:  normal respiratory effort  Musculoskeletal:    RLE:  Vascular: Limb well perfused, compartments soft/compressible. Skin: No erythema/ulcers. Intact.    Neurovascular Status:  Grossly neurovascularly intact throughout   Tenderness to Palpation: Along the course the posterior tibial tendon, sinus Tarsi  -Unable to appropriately assess pain with resisted inversion due to significant tibialis anterior recruitment  -Equinus      LLE:  Vascular: Limb well perfused, compartments soft/compressible. Skin: No erythema/ulcers. Intact. Neurovascular Status:  Grossly neurovascularly intact throughout   Tenderness to Palpation: Posterior heel and distal Achilles tendon, first MTP joint diffusely  --1st MTP:  Grind Test: Positive; Pain with Max Dorsiflexion: Positive, decreased first MTP motion  -Equinus    -Achilles:    -no nodules/thickening of Achilles tendon palpated  -no palpable gap of Achilles tendon noted  -no pain with resisted plantarflexion        RADIOLOGY:   9/28/2021 FINDINGS:  Three weightbearing views (AP, Mortise, and Lateral) of the left ankle were obtained in the office today and reviewed, revealing no acute fracture, dislocation, or radioopaque foreign body/tumor. The ankle mortise is maintained with no widening of the clear spaces. Overall alignment is satisfactory. Chronic changes at the posterior aspect of the calcaneus at the insertion of the Achilles tendon. IMPRESSION:  No acute fracture/dislocation. Electronically signed by Zakia Smith MD        FINDINGS:  Three weightbearing views (AP, Mortise, and Lateral) of the bilateral ankle and three weightbearing views (AP, Oblique, Lateral) of the bilateral foot were obtained in the office today and reviewed, revealing no acute fracture, dislocation, or radioopaque foreign body/tumor. On the left, there are significant degenerative changes at the first MTP joint with joint space narrowing, sclerosis, and osteophytes, and a possible loose body at the lateral aspect of the joint line. Meary's angle is apex plantar bilaterally, with widened talocalcaneal angle's and talar head uncoverage. Degenerative changes at the bilateral subtalar joints. IMPRESSION:  No acute fracture/dislocation. Electronically signed by Zakia Smith MD      Relevant previous imaging reviewed, both imaging and report(s) as below:    No results found. ASSESSMENT AND PLAN:  Body mass index is 39.08 kg/m². She has left Achilles tendon pain, secondary to Achilles tendinitis versus a partial strain/tear, sustained on 9/27/2021 atraumatically. She has a history of left greater than right foot pain, secondary to left foot hallux rigidus (status post first MTP cheilectomy in 2016 by Dr. Ho Streeter), and right foot posterior tibial tendon tendinitis with insufficiency and pes planus with mild subtalar joint arthritis. Notably, she has the past medical history as above. She has a history of paroxysmal atrial tachycardia, vitamin D deficiency, and insulin resistance. We had a discussion today about the likely diagnosis and its natural history, physical exam and imaging findings, as well as various treatment options in detail. Surgically, I do not recommend any surgery for her left Achilles tendon at this time, and recommended conservative management. Surgically, we have discussed a possible future left foot first MTP joint fusion, with gastroc recession. We discussed the expected postoperative course, including the relevant weightbearing restrictions and immobilization. Orders/referrals were placed as below at today's visit. The patient was placed into a cam boot today and provided heel lifts. The patient was provided a night splint. The patient will wear the night splint and use the cam boot at all times, with heel lifts PRN, to avoid pain provoking activity. We did discuss the risk of rupture. I referred the patient to physical therapy for Achilles tendinopathy. I provided a prescription for Voltaren (4g TOPL q QID PRN pain). She has also recently obtained her orthotic (referred to obtain custom flatfoot style orthotics with a Ortega's extension on the left). All questions were answered and the above plan was agreed upon. The patient will return to clinic in 6 weeks without x-rays.   At her next visit, I anticipate weaning her out of the cam boot versus considering MRI, depending on how she is doing. In the future, we may also consider left first MTP joint injections versus surgery, depending on how she wishes to proceed. At the patient's next visit, depending on how the patient is doing and/or new imaging/labs results, we may consider the following options:    []  Lace up ankle     []  CAM boot         []  removable wrist brace     []  PT:        []  Wean out immobilization         []  Adv activity      []  Footmind        []  Spenco       []  Custom Orthotic:               []  AZ brace                    []  Rocker Bottom      []  Night splint    []  Heel cups        []  Strap        []  Toe gizmos    []  Topl        []  NSAIDs         []  Penny        []  Ref:         []  Stress Xray    []  CT        []  MRI  []  Inj:          []  Consider OR      []  Pick OR date    No follow-ups on file. No orders of the defined types were placed in this encounter. Orders Placed This Encounter   Procedures    XR ANKLE LEFT (MIN 3 VIEWS)     WEIGHT BEARING 3 VIEWS:  AP, MORTISE, LATERAL  Please include entire foot     Standing Status:   Future     Number of Occurrences:   1     Standing Expiration Date:   10/28/2021     Order Specific Question:   Reason for exam:     Answer:   eval alignment    Ambulatory referral to Physical Therapy     Referral Priority:   Routine     Referral Type:   Eval and Treat     Referral Reason:   Specialty Services Required     Requested Specialty:   Physical Therapy     Number of Visits Requested:   1         Roxana Leger MD  Orthopedic Surgery        Please excuse any typos/errors, as this note was created with the assistance of voice recognition software. While intending to generate a document that actually reflects the content of the visit, the document can still have some errors including those of syntax and sound-a-like substitutions which may escape proof reading. In such instances, actual meaning can be extrapolated by context.

## 2021-10-01 ENCOUNTER — TELEPHONE (OUTPATIENT)
Dept: FAMILY MEDICINE CLINIC | Age: 51
End: 2021-10-01

## 2021-10-01 NOTE — TELEPHONE ENCOUNTER
Called the weight loss center. They will fax us form for Dr Lori Ferro to fill out when she returns.

## 2021-10-01 NOTE — TELEPHONE ENCOUNTER
----- Message from Ciaran York sent at 10/1/2021 10:07 AM EDT -----  Subject: Message to Provider    Patient is calling to have her clearance   paperwork for surgery sent to Dr Guero Patricio with [facility]   [facility] in [place]. Patient called his office and they stated they   have not received this info yet. Please fax to [phone].  Please let   patient know when this is completed

## 2021-10-26 ENCOUNTER — HOSPITAL ENCOUNTER (OUTPATIENT)
Dept: MAMMOGRAPHY | Age: 51
Discharge: HOME | End: 2021-10-28
Payer: COMMERCIAL

## 2021-10-26 DIAGNOSIS — Z12.31 ENCOUNTER FOR SCREENING MAMMOGRAM FOR MALIGNANT NEOPLASM OF BREAST: ICD-10-CM

## 2021-10-26 PROCEDURE — 77063 BREAST TOMOSYNTHESIS BI: CPT

## 2021-11-08 ENCOUNTER — HOSPITAL ENCOUNTER (OUTPATIENT)
Dept: PHYSICAL THERAPY | Facility: CLINIC | Age: 51
Setting detail: THERAPIES SERIES
Discharge: HOME | End: 2021-11-08
Payer: COMMERCIAL

## 2021-11-08 PROCEDURE — 97110 THERAPEUTIC EXERCISES: CPT

## 2021-11-08 PROCEDURE — 97161 PT EVAL LOW COMPLEX 20 MIN: CPT

## 2021-11-08 NOTE — CONSULTS
[x] THE [facility] &  Therapy  [facility]   [place]: [phone]  F: [phone]      Physical Therapy Lower Extremity Evaluation    Date:  2021  Patient: Sundeep Choe  : 1970  MRN: [id number]  Physician: Dr Hanh Kidd: [facility] [place] ($0 [employer], med [place])  Medical Diagnosis: LLE achilles tendinitis  Rehab Codes: M76.62  Onset date:    Next Dr's appt. : 21    Subjective:   CC/HPI:  Pt with LLE achilles tendonitis, saw Dr Jose Juan Strickland on 21 and given CAM boot with heel lifts as needed for 6 weeks then will discuss weaning out of CAM boot vs MRI. Considerations for surgery or 1st MTP jt injection. She had custom made orthotics in 2021 as well. She no longer is using a heel lift in the boot. The boot has helped her with walking, standing and less pain      PMHx: [] Unremarkable [] Diabetes [] HTN  [] Pacemaker   [] MI/Heart Problems [] Cancer [] Arthritis   [x] Other:  has a past medical history of Gout, Hypothyroidism, and Insulin resistance.         Past Surgical History:    She  has a past surgical history that includes Hysterectomy (2008); cyst removal (); laparoscopy; and Foot surgery (10/2016).                [x] Refer to full medical chart  In EPIC     Tests: [x] X-Ray: Chronic changes at the posterior aspect of the calcaneus at the insertion of the Achilles tendon.       [] MRI:    [] Other:     Comorbidities:   [x] Obesity [] Dialysis  [] N/A   [] Asthma/COPD [] Dementia [] Other:   [] Stroke [] Sleep apnea [] Other:   [] Vascular disease [] Rheumatic disease [] Other:       Medications:  [x] Refer to full medical record [] None [] Other:  Allergies:       [x] Refer to full medical record [] None [] Other:    ADL/IADL [x] Previously independent with all [x] Currently independent with all Who currently assists the patient with task     [] Previously independent with all except: [] Currently independent with all except:     Bathing  [] Assist [] Assist     Dress/grooming [] Assist [] Assist     Transfer/mobility [] Assist [] Assist     Feeding [] Assist [] Assist     Toileting [] Assist [] Assist     Driving [] Assist [] Assist     Housekeeping [] Assist [] Assist     Grocery shop/meal prep [] Assist [] Assist          Gait Prior level of function Current level of function    [x] Independent  [] Assist [x] Independent  [] Assist   Device: [] Independent [] Independent    [] Straight Cane [] Quad cane [] Straight Cane [] Quad cane    [] Standard walker [] Rolling walker   [] 4 wheeled walker [] Standard walker [] Rolling walker   [] 4 wheeled walker    [] Wheelchair [] Wheelchair       Stairs from outside 1   Stairs inside [address] at [address] [facility] status    Work Activities/duties  Standing, walking    Recreational Activities Walking        Pain present?  Yes   Location    Pain Rating currently LLE ankle achilles  3/10  RLE arch foot 7/10   Description of pain LLE ache  RLE arch sharp    Altered Sensation Intact    What makes it worse walking   What makes it better CAM, rest   Symptom progression Boot has improved LLE achilles  Arch still the same on RLE    Sleep LLE sleeping boot              Objective:    STRENGTH    Left Right   Hip Flex 5 5   Ext 5 5   ABD 4 4   Knee Flex 5 5   Ext 5 5   Ankle DF 4- 4+   PF 4 5   INV 4 4+   EVER 4 4+            ROM  ° A/P    Left Right   Ankle DF -10/2 0/5   PF 20    INV 20    EVER 22               OBSERVATION No Deficit Deficit Not Tested Comments   Posture       Forward Head [] [x] []    Rounded Shoulders [] [x] []    Edema [] [x] [] Fig 8: 54 cm bilat    Gait [] [x] [] Analysis: Pt with CAM bot, decreased stance time on LLE         FUNCTION Normal Difficult Unable   Sitting [x] [] []   Standing [] [x] []   Ambulation [] [x] []          Flexibility Normal Left tight Right tight   Hip flexor [] [x] []   quad [] [x] []   HS [] [x] [x]   gastroc [] [x] [x]    [] [] [] [] [] []    [] [] []        Functional Test: LEFS Score: 42% functionally impaired       Assessment:    Patient would benefit from skilled physical therapy services in order to PT to advance strength, ROM     Problems:    [x] ? Pain  [x] ? ROM  [x] ? Strength      Goals  MET NOT MET ON-  GOING  Details   Date Addressed:        STG: To be met in 10 treatments           1. ? Pain: Decrease pain levels to 2/10 with ADL/sport  []  []  []      2. ? ROM: Increase flexibility and AROM limitations throughout to equal bilat to reduce difficulty with ADLs []  []  []      3. ? Strength: Increase MMT to 5/5 throughout to ease functional limitations and mobility  []  []  []     4. Independent with Home Exercise Programs []  []  []      []  []  []     Date Addressed:        LTG: To be met in 20 treatments       1. Improve score on assessment tool LEFS from 42% impairment to less than 20% impairment  []  []  []     2. Reduce pain levels to 1/10 or less with ADLs/sport []  []  []      []  []  []                Patient goals: decrease pain     Rehab Potential:  [x] Good  [] Fair  [] Poor   Suggested Professional Referral:  [x] No  [] Yes:  Barriers to Goal Achievement[de-identified]  [x] No  [] Yes:  Domestic Concerns:  [x] No  [] Yes:    Pt. Education:  [x] Plans/Goals, Risks/Benefits discussed  [x] Home exercise program    Method of Education: [x] Verbal  [x] Demo  [x] Written  Comprehension of Education:  [x] Verbalizes understanding. [x] Demonstrates understanding. [x] Needs Review. [] Demonstrates/verbalizes understanding of HEP/Ed previously given. Access Code: Jamila De La Rosa  URL: [email or web address]  Date: 11/08/2021  Prepared by: Jeryl Siemens    Exercises  Seated Heel Toe Raises - 1 x daily - 7 x weekly - 3 sets - 10 reps  Seated Heel Raise - 1 x daily - 7 x weekly - 3 sets - 10 reps  Seated Ankle Alphabet - 1 x daily - 7 x weekly - 3 sets - 10 reps  Seated Great Toe Extension - 1 x daily - 7 x weekly - 3 sets - 10 reps  Supine Ankle Circles - 1 x daily - 7 x weekly - 3 sets - 1 reps  Seated Lesser Toes Extension - 1 x daily - 7 x weekly - 3 sets - 10 reps  Supine Ankle Pumps - 1 x daily - 7 x weekly - 3 sets - 10 reps  Long Sitting Ankle Plantar Flexion with Resistance - 1 x daily - 7 x weekly - 3 sets - 10 reps  Ankle Dorsiflexion with Resistance - 1 x daily - 7 x weekly - 3 sets - 10 reps  Ankle Eversion with Resistance - 1 x daily - 7 x weekly - 3 sets - 10 reps  Ankle Inversion with Resistance - 1 x daily - 7 x weekly - 3 sets - 10 reps      Treatment Plan:  [x] Therapeutic Exercise    [] Aquatic Therapy   [x] Manual Therapy     [] Electrical Stimulation  [x] Instruction in HEP      [] Lumbar/Cervical Traction  [x] Neuromuscular Re-education [] Cold/hotpack  [] Iontophoresis: 4 mg/mL  [x] Vasocompression (GameReady)                    Dexamethasone Sodium  [x] Gait Training             Phosphate 40-80 mAmin         []  Medication allergies reviewed for use of    Dexamethasone Sodium Phosphate 4mg/ml     with iontophoresis treatments. Pt is not allergic.     Frequency:  2 x/week for 20 visits    Todays Treatment:    Exercise    LLE achilles tendonitis  RLE arch pain Reps/ Time Weight/ Level Comments         Bike            Toe yoga   HEP   Heel/toe raises   HEP   4 way ankle resistance bands   HEP   Calf belt stretch   HEP   HS seated stretch    HEP         Total Gym Squats      4 way hip resistance bands                              Other:     Specific Instructions for next treatment: advance strength program     Evaluation Complexity:  History (Personal factors, comorbidities) [x] 0 [] 1-2 [] 3+   Exam (limitations, restrictions) [x] 1-2 [] 3 [] 4+   Clinical presentation (progression) [x] Stable [] Evolving  [] Unstable   Decision Making [] Low [] Moderate [] High    [x] Low Complexity [] Moderate Complexity [] High Complexity       Treatment Charges: Mins Units   [x] Evaluation       []  Low       []  Moderate []  High 30 1   []  Modalities     [x]  Ther Exercise 10 1   []  Manual Therapy     []  Ther Activities     []  Aquatics     []  Vasocompression     []  Other       TOTAL TREATMENT TIME: 40    Time in:1700   Time Out:1750    Electronically signed by: Sandro Fragoso PT        Physician Signature:________________________________Date:__________________  By signing above or cosigning this note, I have reviewed this plan of care and certify a need for medically necessary rehabilitation services.      *PLEASE SIGN ABOVE AND FAX BACK ALL PAGES*

## 2021-11-11 ENCOUNTER — OFFICE VISIT (OUTPATIENT)
Dept: ORTHOPEDIC SURGERY | Age: 51
End: 2021-11-11
Payer: COMMERCIAL

## 2021-11-11 VITALS — HEIGHT: 68 IN | WEIGHT: 257 LBS | RESPIRATION RATE: 16 BRPM | BODY MASS INDEX: 38.95 KG/M2

## 2021-11-11 DIAGNOSIS — M21.42 PES PLANUS OF BOTH FEET: ICD-10-CM

## 2021-11-11 DIAGNOSIS — M76.62 ACHILLES TENDINITIS OF LEFT LOWER EXTREMITY: Primary | ICD-10-CM

## 2021-11-11 DIAGNOSIS — M21.41 PES PLANUS OF BOTH FEET: ICD-10-CM

## 2021-11-11 DIAGNOSIS — M76.821 POSTERIOR TIBIAL TENDINITIS OF RIGHT LOWER EXTREMITY: ICD-10-CM

## 2021-11-11 DIAGNOSIS — M20.22 HALLUX RIGIDUS OF LEFT FOOT: ICD-10-CM

## 2021-11-11 PROCEDURE — G8427 DOCREV CUR MEDS BY ELIG CLIN: HCPCS | Performed by: ORTHOPAEDIC SURGERY

## 2021-11-11 PROCEDURE — 1036F TOBACCO NON-USER: CPT | Performed by: ORTHOPAEDIC SURGERY

## 2021-11-11 PROCEDURE — 3017F COLORECTAL CA SCREEN DOC REV: CPT | Performed by: ORTHOPAEDIC SURGERY

## 2021-11-11 PROCEDURE — 99213 OFFICE O/P EST LOW 20 MIN: CPT | Performed by: ORTHOPAEDIC SURGERY

## 2021-11-11 PROCEDURE — G8417 CALC BMI ABV UP PARAM F/U: HCPCS | Performed by: ORTHOPAEDIC SURGERY

## 2021-11-11 PROCEDURE — G8484 FLU IMMUNIZE NO ADMIN: HCPCS | Performed by: ORTHOPAEDIC SURGERY

## 2021-11-11 NOTE — PROGRESS NOTES
[facility] [address] AND SPORTS MEDICINE  [facility] Cat  [address] [place] [address]  Dept: [phone]    Ambulatory Orthopedic Consult      CHIEF COMPLAINT:    Chief Complaint   Patient presents with    Ankle Pain     Left     Foot Pain     Bilateral       HISTORY OF PRESENT ILLNESS:      The patient is a 46 y.o. female who is being seen for evaluation of the above, which began atraumatically over 25 years ago  . At today's visit, she is using no brace/assistive device. History is obtained today from:   [x]  the patient     [x]  EMR     []  one family member/friend    []  multiple family members/friends    []  other:      She is previously seen a podiatrist for this problem, and reports that she had surgery with Dr. Anat Shook in 2016 for a left foot first MTP cheilectomy. INTERVAL HISTORY 9/28/2021:  She is seen again today in the office for evaluation of a new issue as above, which began 9/27/2021 atraumatically  . At today's visit, she is using no brace/assistive device. History is obtained today from:   [x]  the patient     []  EMR     []  one family member/friend    []  multiple family members/friends    []  other:      She localizes her pain to her left Achilles tendon. INTERVAL HISTORY 11/11/2021:  She is seen again today in the office for follow up of a previous issue (as above). Since being seen last, the patient is doing better. At today's visit, she is using a removable brace. History is obtained today from:   [x]  the patient     []  EMR     []  one family member/friend    []  multiple family members/friends    []  other:          REVIEW OF SYSTEMS:  Musculoskeletal: See HPI for pertinent positives     Past Medical History:    She  has a past medical history of Gout, Hypothyroidism, and Insulin resistance.      Past Surgical History:    She  has a past surgical history that includes Hysterectomy (12/2008); cyst removal (2000); laparoscopy; and Foot surgery (10/2016).      Current Medications:     Current Outpatient Medications:     diclofenac sodium (VOLTAREN) 1 % GEL, Apply 2 g topically 4 times daily as needed for Pain, Disp: 2 g, Rfl: 0    citalopram (CELEXA) 10 MG tablet, take 1 tablet by mouth once daily, Disp: , Rfl:     atorvastatin (LIPITOR) 20 MG tablet, take 1 tablet by mouth once daily, Disp: , Rfl:     escitalopram (LEXAPRO) 10 MG tablet, take 1/2 tablet by mouth once daily for 7 days then INCREASE to 1 tablet by mouth once daily, Disp: , Rfl:     magnesium citrate solution, Take 600 mLs by mouth once, Disp: , Rfl:     zoster recombinant adjuvanted vaccine (SHINGRIX) 50 MCG/0.5ML SUSR injection, Inject 0.5 mLs into the muscle See Admin Instructions 1 dose now and repeat in 2-6 months, Disp: 0.5 mL, Rfl: 1    halobetasol (ULTRAVATE) 0.05 % ointment, apply to affected area twice a day, Disp: 50 g, Rfl: 1    diclofenac sodium (VOLTAREN) 1 % GEL, Apply 2 g topically 2 times daily, Disp: 100 g, Rfl: 0    valsartan (DIOVAN) 160 MG tablet, take 1 tablet by mouth once daily, Disp: 30 tablet, Rfl: 5    naproxen (NAPROSYN) 500 MG tablet, take 1 tablet by mouth twice a day with meals, Disp: 180 tablet, Rfl: 0    hydroCHLOROthiazide (HYDRODIURIL) 25 MG tablet, take 1/2 tablet by mouth once daily, Disp: 45 tablet, Rfl: 1    ranitidine (ZANTAC) 150 MG tablet, take 1 tablet by mouth twice a day, Disp: 60 tablet, Rfl: 3    metFORMIN (GLUCOPHAGE-XR) 500 MG extended release tablet, Take 2 tablets by mouth daily (with breakfast) (Patient taking differently: Take 1,000 mg by mouth daily (with breakfast) Once every other day.), Disp: 60 tablet, Rfl: 5    furosemide (LASIX) 40 MG tablet, take 1 tablet by mouth once daily, Disp: 30 tablet, Rfl: 1    cyclobenzaprine (FLEXERIL) 5 MG tablet, take 1 tablet by mouth twice a day if needed for muscle spasm, Disp: 180 tablet, Rfl: 0    fluticasone (FLONASE) 50 MCG/ACT nasal spray, 1 spray by Nasal route daily, Disp: 16 g, Rfl: 3    ergocalciferol (ERGOCALCIFEROL) 46974 units capsule, Take 1 capsule by mouth once a week, Disp: 4 capsule, Rfl: 3    etodolac (LODINE) 400 MG tablet, take 1 tablet by mouth twice a day, Disp: , Rfl: 0    levothyroxine (SYNTHROID) 25 MCG tablet, take 1 tablet by mouth once daily, Disp: 90 tablet, Rfl: 0    cetirizine (ZYRTEC) 1 MG/ML syrup, Take 5 mLs by mouth daily, Disp: 350 mL, Rfl: 2    Multiple Vitamins-Minerals (THERAPEUTIC MULTIVITAMIN-MINERALS) tablet, Take 1 tablet by mouth daily, Disp: , Rfl:      Allergies:    Banana, Ioxaglate, Iv contrast [iodides], Molds & smuts, Mushroom extract complex, Nut [peanut-containing drug products], Peanut oil, Bactrim [sulfamethoxazole-trimethoprim], Roel per [benzoyl peroxide], and Nickel    Family History:  family history includes Arthritis in her father; Cancer in her maternal grandfather and mother; Diabetes in her father and maternal grandfather; High Blood Pressure in her father, maternal grandmother, and paternal grandmother; Other in her sister. Social History:   Social History     Occupational History    Not on file   Tobacco Use    Smoking status: Never Smoker    Smokeless tobacco: Never Used   Substance and Sexual Activity    Alcohol use: No     Alcohol/week: 0.0 standard drinks    Drug use: No    Sexual activity: Not on file     Occupation:     OBJECTIVE:  Resp 16   Ht 5' 8\" (1.727 m)   Wt 257 lb (116.6 kg)   BMI 39.08 kg/m²    Psych: alert and oriented to person, time, and place  Cardio:  well perfused extremities, no cyanosis  Resp:  normal respiratory effort  Musculoskeletal:    RLE:  Vascular: Limb well perfused, compartments soft/compressible. Skin: No erythema/ulcers. Intact.    Neurovascular Status:  Grossly neurovascularly intact throughout   Tenderness to Palpation: Along the course the posterior tibial tendon, sinus Tarsi  -Unable to appropriately assess pain with resisted inversion due to significant tibialis anterior recruitment  -Equinus      LLE:  Vascular: Limb well perfused, compartments soft/compressible. Skin: No erythema/ulcers. Intact. Neurovascular Status:  Grossly neurovascularly intact throughout   Tenderness to Palpation: Posterior heel and distal Achilles tendon, first MTP joint diffusely   --1st MTP:  Grind Test: Positive; Pain with Max Dorsiflexion: Positive, decreased first MTP motion  -Equinus    -Achilles:    -no nodules/thickening of Achilles tendon palpated  -no palpable gap of Achilles tendon noted  -no pain with resisted plantarflexion        RADIOLOGY:   11/11/2021 No new radiology images today. Prior images reviewed for reference. FINDINGS:  Three weightbearing views (AP, Mortise, and Lateral) of the left ankle were obtained in the office today and reviewed, revealing no acute fracture, dislocation, or radioopaque foreign body/tumor. The ankle mortise is maintained with no widening of the clear spaces. Overall alignment is satisfactory. Chronic changes at the posterior aspect of the calcaneus at the insertion of the Achilles tendon. IMPRESSION:  No acute fracture/dislocation. Electronically signed by Hawk Leon MD        FINDINGS:  Three weightbearing views (AP, Mortise, and Lateral) of the bilateral ankle and three weightbearing views (AP, Oblique, Lateral) of the bilateral foot were obtained in the office today and reviewed, revealing no acute fracture, dislocation, or radioopaque foreign body/tumor. On the left, there are significant degenerative changes at the first MTP joint with joint space narrowing, sclerosis, and osteophytes, and a possible loose body at the lateral aspect of the joint line. Meary's angle is apex plantar bilaterally, with widened talocalcaneal angle's and talar head uncoverage. Degenerative changes at the bilateral subtalar joints. IMPRESSION:  No acute fracture/dislocation.     Electronically signed by May Jeffery MD Jose Guadalupe      Relevant previous imaging reviewed, both imaging and report(s) as below:    No results found. ASSESSMENT AND PLAN:  Body mass index is 39.08 kg/m². She has left Achilles tendinopathy, which began on 9/27/2021 atraumatically. Overall, she is doing well with conservative management. She has a history of left greater than right foot pain, secondary to left foot hallux rigidus (status post first MTP cheilectomy in 2016 by Dr. Octavio Dawn), and right foot posterior tibial tendon tendinitis with insufficiency and pes planus with mild subtalar joint arthritis. Notably, she has the past medical history as above. She has a history of paroxysmal atrial tachycardia, vitamin D deficiency, and insulin resistance. We had a discussion today about the likely diagnosis and its natural history, physical exam and imaging findings, as well as various treatment options in detail. Surgically, we have discussed a possible future left Achilles tendon debridement, however, I did recommend continuing conservative management, as she is doing well and continuing to improve. Surgically, we have discussed a possible future left foot first MTP joint fusion, with gastroc recession. We discussed the expected postoperative course, including the relevant weightbearing restrictions and immobilization. Orders/referrals were placed as below at today's visit. For her left foot Achilles tendinopathy and hallux rigidus, the patient may ambulate in a regular shoe, with heel lifts as needed to avoid pain provoking activity, which were provided today. The patient may use a night splint as needed. I recommended that the patient continue physical therapy/home exercises. We again discussed the risk of rupture. She may continue to use topical Voltaren gel for pain. She may continue to use her custom flatfoot style orthotic with Ortega's extension.     For her right posterior tibial tendinopathy, she will continue to use her custom flatfoot style orthotic. The patient was also referred to physical therapy for my flatfoot protocol. All questions were answered and the above plan was agreed upon. The patient will return to clinic in the future as needed. At her next visit, we may consider a left first MTP joint injection versus surgery, depending on how she is doing. At the patient's next visit, depending on how the patient is doing and/or new imaging/labs results, we may consider the following options:    []  Lace up ankle     []  CAM boot         []  removable wrist brace     []  PT:        []  Wean out immobilization         []  Adv activity      []  Footmind        []  Spenco       []  Custom Orthotic:               []  AZ brace                    []  Rocker Bottom      []  Night splint    []  Heel cups        []  Strap        []  Toe gizmos    []  Topl        []  NSAIDs         []  Penny        []  Ref:         []  Stress Xray    []  CT        []  MRI  []  Inj:          []  Consider OR      []  Pick OR date    No follow-ups on file. No orders of the defined types were placed in this encounter. Orders Placed This Encounter   Procedures    Ambulatory referral to Physical Therapy     Referral Priority:   Routine     Referral Type:   Eval and Treat     Referral Reason:   Specialty Services Required     Requested Specialty:   Physical Therapy     Number of Visits Requested:   1    Ambulatory referral to Physical Therapy     Referral Priority:   Routine     Referral Type:   Eval and Treat     Referral Reason:   Specialty Services Required     Requested Specialty:   Physical Therapy     Number of Visits Requested:   1         Em Mcclure MD  Orthopedic Surgery        Please excuse any typos/errors, as this note was created with the assistance of voice recognition software.  While intending to generate a document that actually reflects the content of the visit, the document can still have some errors including those of syntax and sound-a-like substitutions which may escape proof reading. In such instances, actual meaning can be extrapolated by context.

## 2021-11-18 ENCOUNTER — HOSPITAL ENCOUNTER (OUTPATIENT)
Dept: PHYSICAL THERAPY | Facility: CLINIC | Age: 51
Setting detail: THERAPIES SERIES
Discharge: HOME | End: 2021-11-18
Payer: COMMERCIAL

## 2021-11-18 PROCEDURE — 97110 THERAPEUTIC EXERCISES: CPT

## 2021-11-18 NOTE — FLOWSHEET NOTE
[x] THE [facility] &  Therapy  [facility]   [place]: [phone]  F: [phone]      Physical Therapy Daily Treatment Note    Date:  2021  Patient Name:  Farzad Watts    :  1970  MRN: [id number]  Physician: Dr Lyla Hawk: Med [place] ($0 Cop[employer], med [place]ancielia [place])  Medical Diagnosis: LLE achilles tendinitis               Rehab Codes: M76.62  Onset date:                             Next 's appt. : 21    Visit# / total visits:      Cancels/No Shows:     Subjective:    Pain:  [] Yes  [x] No Location: LLE  Pain Rating: (0-10 scale) 0/10  Pain altered Tx:  [x] No  [] Yes  Action:  Comments: Patient arrived noting she has been compliant with HEP with decreased symptoms from initial visit. Objective:     Exercise     LLE achilles tendonitis  RLE arch pain Reps/ Time Weight/ Level Comments             NuStep 10'                 Toe yoga  x20   HEP   Heel/toe raises  x20   HEP   4 way ankle resistance bands  x20 blue HEP   Calf belt stretch  3x30\"   HEP   HS seated stretch   3x30\"   HEP   BAPS  x20               Total Gym Squats  2x10 L20     4 way hip resistance bands  x15 orange                                            Other:      Specific Instructions for next treatment: advance strength program     Treatment Charges: Mins Units   []  Modalities     [x]  Ther Exercise 30 2   [x]  Manual Therapy 10 1   []  Ther Activities     []  Aquatics     []  Vasocompression     []  Other     Total Treatment time 40 3       Assessment: [x] Progressing toward goals. Progressed strength and stability program this visit. Patient notes no pain and decreased tension post manual. Plan to progress further next visit with ritika shoes donned. [] No change.      [] Other:  [x] Patient would continue to benefit from skilled physical therapy services in order to: increase strength and ROM to normalize gait without pain.        Goals  MET

## 2021-11-22 ENCOUNTER — HOSPITAL ENCOUNTER (OUTPATIENT)
Dept: PHYSICAL THERAPY | Facility: CLINIC | Age: 51
Setting detail: THERAPIES SERIES
Discharge: HOME | End: 2021-11-22
Payer: COMMERCIAL

## 2021-11-22 PROCEDURE — 97140 MANUAL THERAPY 1/> REGIONS: CPT

## 2021-11-22 PROCEDURE — 97110 THERAPEUTIC EXERCISES: CPT

## 2021-11-22 RX ORDER — NAPROXEN 500 MG/1
TABLET ORAL
Qty: 180 TABLET | Refills: 0 | Status: SHIPPED | OUTPATIENT
Start: 2021-11-22

## 2021-11-22 NOTE — FLOWSHEET NOTE
[x] THE [facility] &  Therapy  [facility]   [place]: [phone]  F: [phone]      Physical Therapy Daily Treatment Note    Date:  2021  Patient Name:  Rubens Bose    :  1970  MRN: [id number]  Physician: Dr Vega Rounds: [facility] ($0 Copay, med Jalen Guerreroaudi)  Medical Diagnosis: LLE achilles tendinitis               Rehab Codes: M76.62  Onset date:                             Next Dr's appt. : 21  Visit# / total visits: 3/20     Cancels/No Shows:0/0     Subjective:    Pain:  [] Yes  [x] No Location: LLE  Pain Rating: (0-10 scale) 0/10  Pain altered Tx:  [x] No  [] Yes  Action:  Comments: Patient arrived noting she has been compliant with HEP with decreased symptoms from initial visit overall, reports she is keeping up with her mobility. Objective:   Per Dr Leena Kyle on 21: patient may ambulate in a regular shoe, with heel lifts as needed to avoid pain provoking activity    Exercise     LLE achilles tendonitis  RLE arch pain Reps/ Time Weight/ Level Comments             Bike  10'                 4 way ankle resistance bands  x20 blue HEP   Calf belt stretch  3x30\"   HEP   HS seated stretch   3x30\"   HEP   BAPS  x20 bilat  CW  CCW   Total Gym heel raises   2x10 L20      Total Gym Squats  2x10 L20     4 way hip resistance bands  x15 Blue  bilat    Balance board L3 x5'        Step ups x20        SLS Rebounder x20  bilat      Cones  x3        Other:     Manual: MFR to bilat gastroc with hypervolt       Specific Instructions for next treatment: advance strength program     Treatment Charges: Mins Units   []  Modalities     [x]  Ther Exercise 35 2   [x]  Manual Therapy 10 1   []  Ther Activities     []  Aquatics     []  Vasocompression     []  Other     Total Treatment time 45 3       Assessment: [x] Progressing toward goals. Patient performed program as per flow sheet above with new additions with good tolerance. She was able to complete program with only fatigue and no change in pain symptoms. [] No change. [] Other:  [x] Patient would continue to benefit from skilled physical therapy services in order to: increase strength and ROM to normalize gait without pain.        Goals  MET NOT MET ON-  GOING  Details   Date Addressed:            STG: To be met in 10 treatments  ?          1. ? Pain: Decrease pain levels to 2/10 with ADL/sport  []? ?  []??  []??      2. ? ROM: Increase flexibility and AROM limitations throughout to equal bilat to reduce difficulty with ADLs []? ?  []??  []??      3. ? Strength: Increase MMT to 5/5 throughout to ease functional limitations and mobility  []? ?  []??  []??      4. Independent with Home Exercise Programs []? ?  []??  []??        []? ?  []??  []??      Date Addressed:            LTG: To be met in 20 treatments           1. Improve score on assessment tool LEFS from 42% impairment to less than 20% impairment  []??  []??  []??      2. Reduce pain levels to 1/10 or less with ADLs/sport []? ?  []??  []??        []? ?  []??  []??                        Patient goals: decrease pain     Pt. Education:  [x] Yes  [] No  [x] Reviewed Prior HEP/Ed, review of HEP   Method of Education: [x] Verbal  [x] Demo  [] Written  Comprehension of Education:  [x] Verbalizes understanding. [] Demonstrates understanding. [] Needs review. [x] Demonstrates/verbalizes HEP/Ed previously given. Plan: [x] Continue current frequency toward long and short term goals. [x] Specific Instructions for subsequent treatments: continue strength progressions per tolerance.        Time In: 1700            Time Out: 1750    Electronically signed by:  Claudette White, PT

## 2021-11-24 ENCOUNTER — HOSPITAL ENCOUNTER (OUTPATIENT)
Dept: PHYSICAL THERAPY | Facility: CLINIC | Age: 51
Setting detail: THERAPIES SERIES
Discharge: HOME | End: 2021-11-24
Payer: COMMERCIAL

## 2021-11-24 PROCEDURE — 97110 THERAPEUTIC EXERCISES: CPT

## 2021-11-24 PROCEDURE — 97140 MANUAL THERAPY 1/> REGIONS: CPT

## 2021-11-24 NOTE — FLOWSHEET NOTE
beginning of treatment due to significant tightness. Pt notes decreased symptoms post manual and stretching; plans to add step gastroc stretch to HEP and will perform multiple times a day. Pt with less pain in arch of R foot rating 5/10 pain. Will resume strength program next visit and address manual as needed. [] No change. [] Other:  [x] Patient would continue to benefit from skilled physical therapy services in order to: increase strength and ROM to normalize gait without pain.        Goals  MET NOT MET ON-  GOING  Details   Date Addressed:            STG: To be met in 10 treatments  ?          1. ? Pain: Decrease pain levels to 2/10 with ADL/sport  []? ?  []??  []??      2. ? ROM: Increase flexibility and AROM limitations throughout to equal bilat to reduce difficulty with ADLs []? ?  []??  []??      3. ? Strength: Increase MMT to 5/5 throughout to ease functional limitations and mobility  []? ?  []??  []??      4. Independent with Home Exercise Programs []? ?  []??  []??        []? ?  []??  []??      Date Addressed:            LTG: To be met in 20 treatments           1. Improve score on assessment tool LEFS from 42% impairment to less than 20% impairment  []??  []??  []??      2. Reduce pain levels to 1/10 or less with ADLs/sport []? ?  []??  []??        []? ?  []??  []??                        Patient goals: decrease pain     Pt. Education:  [x] Yes  [] No  [x] Reviewed Prior HEP/Ed, review of HEP: step stretch   Method of Education: [x] Verbal  [x] Demo  [] Written  Comprehension of Education:  [x] Verbalizes understanding. [] Demonstrates understanding. [] Needs review. [x] Demonstrates/verbalizes HEP/Ed previously given. Plan: [x] Continue current frequency toward long and short term goals. [x] Specific Instructions for subsequent treatments: continue strength progressions per tolerance.        Time In: 10:00am            Time Out: 10:50am    Electronically signed by:  Hussain De Souza PTA

## 2021-12-01 ENCOUNTER — HOSPITAL ENCOUNTER (OUTPATIENT)
Dept: PHYSICAL THERAPY | Facility: CLINIC | Age: 51
Setting detail: THERAPIES SERIES
Discharge: HOME | End: 2021-12-01
Payer: COMMERCIAL

## 2021-12-06 ENCOUNTER — HOSPITAL ENCOUNTER (OUTPATIENT)
Dept: PHYSICAL THERAPY | Facility: CLINIC | Age: 51
Setting detail: THERAPIES SERIES
Discharge: HOME | End: 2021-12-06
Payer: COMMERCIAL

## 2021-12-06 PROCEDURE — 97140 MANUAL THERAPY 1/> REGIONS: CPT

## 2021-12-06 PROCEDURE — 97110 THERAPEUTIC EXERCISES: CPT

## 2021-12-08 ENCOUNTER — HOSPITAL ENCOUNTER (OUTPATIENT)
Dept: PHYSICAL THERAPY | Facility: CLINIC | Age: 51
Setting detail: THERAPIES SERIES
Discharge: HOME | End: 2021-12-08
Payer: COMMERCIAL

## 2021-12-08 NOTE — FLOWSHEET NOTE
[x] [facility]  Outpatient Rehabilitation &  Therapy  [facility]   [place]: [phone]  F: [phone]      Physical Therapy Cancel/No Show note    Date: 2021  Patient: Maggie Martines  : 1970  MRN: [id number]    Cancels/No Shows to date:     For today's appointment patient:    [x]  Cancelled    [] Rescheduled appointment    [] No-show     Reason given by patient:    []  Patient ill    []  Conflicting appointment    [] No transportation      [x] Conflict with work    [] No reason given    [] Weather related    [] [id number]    [] Other:      Comments:        [] Next appointment was confirmed    Electronically signed by: Nery Calderon

## 2022-01-06 NOTE — FLOWSHEET NOTE
progressions with no increase in symptoms. Stressed importance of HEP and continued stretching to address soreness. [] No change. [] Other:  [x] Patient would continue to benefit from skilled physical therapy services in order to: increase strength and ROM to normalize gait without pain.        Goals  MET NOT MET ON-  GOING  Details   Date Addressed:            STG: To be met in 10 treatments  ?          1. ? Pain: Decrease pain levels to 2/10 with ADL/sport  []? ?  []??  []??      2. ? ROM: Increase flexibility and AROM limitations throughout to equal bilat to reduce difficulty with ADLs []? ?  []??  []??      3. ? Strength: Increase MMT to 5/5 throughout to ease functional limitations and mobility  []? ?  []??  []??      4. Independent with Home Exercise Programs []? ?  []??  []??        []? ?  []??  []??      Date Addressed:            LTG: To be met in 20 treatments           1. Improve score on assessment tool LEFS from 42% impairment to less than 20% impairment  []??  []??  []??      2. Reduce pain levels to 1/10 or less with ADLs/sport []? ?  []??  []??        []? ?  []??  []??                        Patient goals: decrease pain     Pt. Education:  [x] Yes  [] No  [x] Reviewed Prior HEP/Ed, review of HEP: step stretch   Method of Education: [x] Verbal  [x] Demo  [] Written  Comprehension of Education:  [x] Verbalizes understanding. [] Demonstrates understanding. [] Needs review. [x] Demonstrates/verbalizes HEP/Ed previously given. Plan: [x] Continue current frequency toward long and short term goals. [x] Specific Instructions for subsequent treatments: continue strength progressions per tolerance.        Time In: 1600             Time Out: 1650     Electronically signed by:  Yuli Ham PTA Quality 226: Preventive Care And Screening: Tobacco Use: Screening And Cessation Intervention: Patient screened for tobacco use and is an ex/non-smoker Detail Level: Detailed Quality 130: Documentation Of Current Medications In The Medical Record: Current Medications Documented Quality 431: Preventive Care And Screening: Unhealthy Alcohol Use - Screening: Patient screened for unhealthy alcohol use using a single question and scores less than 2 times per year

## 2022-05-10 DIAGNOSIS — M25.511 RIGHT SHOULDER PAIN, UNSPECIFIED CHRONICITY: Primary | ICD-10-CM

## 2022-05-10 NOTE — PROGRESS NOTES
[address] [place] [address] AND SPORTS MEDICINE  [address] [place] [id number]  Dept: [phone]  Dept Fax: [phone]        Right Shoulder -established patient new problem    Chief Complaint:     Chief Complaint   Patient presents with    Shoulder Pain     Right Shoulder Pain     HPI:     Carli De La Paz is a 46y.o. year old right hand dominant female that has had pain in the right shoulder for 2 years but worse in the last 5 months. As far as any trauma to the shoulder, the patient indicates none. The pain is worse at night and when doing overhead activities. Weakness of the shoulder has been noted. The pain restricts activities such as doing her hair, getting a gallon of milk, sleeping. The pain does not seem to improve with time. The following medications have been tried Tylenol, Naprosyn. The patient has not had a corticosteroid injection. The patient has not tried physical therapy. The patient has not has surgery. The opposite shoulder is  okay. Neck pain has been present. She states on occasion periodically she will get some numbness in her arm. She has lost 60 pounds since August.  She has been actively trying to lose weight because her mother  of diabetes and she was considered prediabetic. Review of Systems   Constitutional: Positive for activity change. Negative for appetite change, fatigue and fever. Respiratory: Negative. Negative for apnea, cough, chest tightness and shortness of breath. Cardiovascular: Negative. Negative for chest pain, palpitations and leg swelling. Gastrointestinal: Negative for abdominal distention, abdominal pain, constipation, diarrhea, nausea and vomiting. Genitourinary: Negative for difficulty urinating, dysuria and hematuria. Musculoskeletal: Positive for arthralgias. Negative for gait problem, joint swelling and myalgias. Skin: Negative for color change and rash. Neurological: Negative for dizziness, weakness, numbness and headaches. Psychiatric/Behavioral: Positive for sleep disturbance. Past Medical History:    Past Medical History:   Diagnosis Date    Gout     Hypothyroidism     Insulin resistance     Diagnosed in 2000       Past Surgical History:    Past Surgical History:   Procedure Laterality Date    CYST REMOVAL  2000    FOOT SURGERY  10/2016    HYSTERECTOMY  12/2008    pt has right ovary    LAPAROSCOPY         CurrentMedications:   Current Outpatient Medications   Medication Sig Dispense Refill    methylPREDNISolone (MEDROL DOSEPACK) 4 MG tablet Take by mouth.  1 kit 0    tiZANidine (ZANAFLEX) 4 MG tablet Take 1 tablet by mouth 3 times daily for 10 days 30 tablet 0    naproxen (NAPROSYN) 500 MG tablet take 1 tablet by mouth twice a day with meals 180 tablet 0    diclofenac sodium (VOLTAREN) 1 % GEL Apply 2 g topically 4 times daily as needed for Pain 2 g 0    citalopram (CELEXA) 10 MG tablet take 1 tablet by mouth once daily      atorvastatin (LIPITOR) 20 MG tablet take 1 tablet by mouth once daily (Patient not taking: Reported on 5/11/2022)      escitalopram (LEXAPRO) 10 MG tablet take 1/2 tablet by mouth once daily for 7 days then INCREASE to 1 tablet by mouth once daily      magnesium citrate solution Take 600 mLs by mouth once      halobetasol (ULTRAVATE) 0.05 % ointment apply to affected area twice a day 50 g 1    diclofenac sodium (VOLTAREN) 1 % GEL Apply 2 g topically 2 times daily 100 g 0    valsartan (DIOVAN) 160 MG tablet take 1 tablet by mouth once daily 30 tablet 5    hydroCHLOROthiazide (HYDRODIURIL) 25 MG tablet take 1/2 tablet by mouth once daily 45 tablet 1    ranitidine (ZANTAC) 150 MG tablet take 1 tablet by mouth twice a day 60 tablet 3    metFORMIN (GLUCOPHAGE-XR) 500 MG extended release tablet Take 2 tablets by mouth daily (with breakfast) (Patient not taking: Reported on 5/11/2022) 60 tablet 5    furosemide (LASIX) 40 MG tablet take 1 tablet by mouth once daily (Patient not taking: Reported on 5/11/2022) 30 tablet 1    cyclobenzaprine (FLEXERIL) 5 MG tablet take 1 tablet by mouth twice a day if needed for muscle spasm 180 tablet 0    fluticasone (FLONASE) 50 MCG/ACT nasal spray 1 spray by Nasal route daily 16 g 3    ergocalciferol (ERGOCALCIFEROL) 23367 units capsule Take 1 capsule by mouth once a week 4 capsule 3    etodolac (LODINE) 400 MG tablet take 1 tablet by mouth twice a day  0    levothyroxine (SYNTHROID) 25 MCG tablet take 1 tablet by mouth once daily (Patient not taking: Reported on 5/11/2022) 90 tablet 0    cetirizine (ZYRTEC) 1 MG/ML syrup Take 5 mLs by mouth daily 350 mL 2    Multiple Vitamins-Minerals (THERAPEUTIC MULTIVITAMIN-MINERALS) tablet Take 1 tablet by mouth daily       No current facility-administered medications for this visit. Allergies:    Banana, Ioxaglate, Iv contrast [iodides], Molds & smuts, Mushroom extract complex, Nut [peanut-containing drug products], Peanut oil, Bactrim [sulfamethoxazole-trimethoprim], Roel [employer] peroxide], and Nickel    Social History:   Social History     Socioeconomic History    Marital status:      Spouse name: None    Number of children: None    Years of education: None    Highest education level: None   Occupational History    None   Tobacco Use    Smoking status: Never Smoker    Smokeless tobacco: Never Used   Substance and Sexual Activity    Alcohol use: No     Alcohol/week: 0.0 standard drinks    Drug use: No    Sexual activity: None   Other Topics Concern    None   Social History Narrative    None     Social Determinants of Health     Financial Resource Strain:     Difficulty of Paying Living Expenses: Not on file   Food Insecurity:     Worried About Running Out of Food in the Last Year: Not on file    [place] of Food in the Last Year: Not on file   Transportation Needs:     Lack of Transportation (Medical):  Not on file  Lack of Transportation (Non-Medical): Not on file   Physical Activity:     Days of Exercise per Week: Not on file    Minutes of Exercise per Session: Not on file   Stress:     Feeling of Stress : Not on file   Social Connections:     Frequency of Communication with Friends and Family: Not on file    Frequency of Social Gatherings with Friends and Family: Not on file    Attends [employer] Services: Not on file    Active Member of [address] [place] [address] [employer] or Organizations: Not on file    Attends Club or Organization Meetings: Not on file    Marital Status: Not on file   Intimate Partner Violence:     Fear of Current or Ex-Partner: Not on file    Emotionally Abused: Not on file    Physically Abused: Not on file    Sexually Abused: Not on file   Housing Stability:     Unable to Pay for Housing in the Last Year: Not on file    Number of [place] in the Last Year: Not on file    Unstable Housing in the Last Year: Not on file       Family History:  Family History   Problem Relation Age of Onset    Cancer Mother     Diabetes Father     High Blood Pressure Father     Arthritis Father     Other Sister         BRAIN TUMOR    High Blood Pressure Maternal Grandmother     Cancer Maternal Grandfather         PROSTATE    Diabetes Maternal Grandfather     High Blood Pressure Paternal Grandmother        I have reviewed the CC, HPI, ROS, PMH, FHX, Social History, and if not present in this note, I have reviewed in the patient's chart. I agree with the documentation provided by other staff and have reviewed their documentation prior to providing my signature indicating agreement. Vitals:   /86   Pulse 86   Resp 10   Ht 5' 8\" (1.727 m)   Wt 194 lb (88 kg)   BMI 29.50 kg/m²  Body mass index is 29.5 kg/m². Physical Examination:     Orthopedics:    GENERAL: Alert and oriented X3 in no acute distress. SKIN: Intact without lesions or ulcerations.   NEURO: Musculoskeletal and axillary nerves intact to sensory and motor testing. VASC: Capillary refill is less than 3 seconds. Right Shoulder Exam    GEN: Alert and oriented X 3, in no acute distress. SKIN: Intact without rashes, lesions, or ulcerations. NEURO: Musculoskeletal ans axillary nerves intact to sensory and motor testing. VASC: Cap refill less than than 3 secs. Negative Adson's test, Negative Alysia's test.  ROM: 160 degrees of forward elevation, 70 degrees of external rotation in neutral, 90 degrees of external rotation in abduction, internal rotation to T8. STRENGTH: Supraspinatus 4+/5, external rotators 4/5. MUSC: No atrophy, negative subscap lift off or belly press test.  IMP: mild Neer's sign, mild Hawkin's sign, negative Coracoid impingement, no painful arc, no pain with cross body abduction. PALP: no pain over anterolateral acromion, no pain over AC joint, + pain over traps/rhomboids. Large trapezius trigger point  INST: negative Bourbon's test, mild Speed's test    Cervical Spine Exam    GEN:  Alert and oriented X 3 in no acute distress  GAIT:  Normal speed and steady  SKIN:  Intact without lesions or ulcerations. ROM: Cervical spine contour has normal lordosis. ROM is painful. There is limitation of:   Left side bending, Right rotation or Left rotation. PALP: Palpation reveals trapezius trigger point. NEURO: Sensation intact to light touch over C-5 through T-1 dermatomes. Reflexes:           Bicep: 2+/4           Tricep: 2+/4          Brachioradialis. 2+]/4   VASC: Cap refill less than 2 sec.  negative Adson, Darlene and hyperabduction tests. TESTS:   Ramirez's reflex: negative  Diadochokinesis test: negative   Babinski: negative   Clonus: negative   Spurling's maneuver: positive on Right side. Lhermitte's sign: negative  Shoulder abduction relief sign: negative     Erin's sign: negative       Assessment:     1. Rotator cuff syndrome, right    2. Trigger point of right shoulder region    3.  Cervical radiculopathy      Procedures: Procedure: no  Radiology:   XR SHOULDER RIGHT (MIN 2 VIEWS)    Result Date: 5/11/2022  SHOULDER X-RAY Two views of the right shoulder and 2 views of the scapula, including AP, scapular Y, outlet and axillary views reveal: The glenohumeral joint is well reduced mild arthritic changes. Proximal migration of the humeral head has not occurred. Acromion is a type III. The acromioclavicular joint shows mild degenerative changes. Impression: Mild degenerative changes of the right shoulder     Plan:   Treatment : I reviewed the X-ray with the patient and I informed them that the x-ray did show some mild degenerative changes with the hooking of her acromion. We discussed the etiologies and natural histories of rotator cuff syndrome, cervical radiculopathy and trapezius trigger point. We discussed the various treatment alternatives including anti-inflammatory medications, physical therapy, injections, further imaging studies and as a last result surgery. During today's visit, the pain is coming from her neck and some is coming from her shoulder. Sometimes we have to distinguish which 1 is worse. To me today it seems to be her neck. I would like to do a Medrol Dosepak and some muscle relaxers that will help with both the shoulder and the neck. At this time, the patient has opted for a Medrol Dosepak, Zanaflex and going to physical therapy. A physical therapy prescription was given. I did explain if her shoulder still really bothering her in approximately 4 weeks she could come in and we would do a subacromial injection into her shoulder. Patient should return to the clinic in 6 weeks to follow up with Cornelius Ross PA-C. The patient will call the office immediately with any problems. Orders Placed This Encounter   Medications    methylPREDNISolone (MEDROL DOSEPACK) 4 MG tablet     Sig: Take by mouth.      Dispense:  1 kit     Refill:  0    tiZANidine (ZANAFLEX) 4 MG tablet     Sig: Take 1 tablet by mouth 3 times daily for 10 days     Dispense:  30 tablet     Refill:  0       Orders Placed This Encounter   Procedures    [facility] [place] [facility]     Referral Priority:   Routine     Referral Type:   Eval and Treat     Referral Reason:   Specialty Services Required     Requested Specialty:   Physical Therapy     Number of Visits Requested:   1       This note is created with the assistance of a speech recognition program.  While intending to generate a document that actually reflects the content of the visit, the document can still have some errors including those of syntax and sound a like substitutions which may escape proof reading.   In such instances, actual meaning can be extrapolated by contextual diversion     Electronically signed by Amarjit Bocanegra PA-C, on 5/11/2022 at 9:40 AM

## 2022-05-11 ENCOUNTER — OFFICE VISIT (OUTPATIENT)
Dept: ORTHOPEDIC SURGERY | Age: 52
End: 2022-05-11
Payer: COMMERCIAL

## 2022-05-11 VITALS
BODY MASS INDEX: 29.4 KG/M2 | SYSTOLIC BLOOD PRESSURE: 135 MMHG | HEART RATE: 86 BPM | WEIGHT: 194 LBS | HEIGHT: 68 IN | DIASTOLIC BLOOD PRESSURE: 86 MMHG | RESPIRATION RATE: 10 BRPM

## 2022-05-11 DIAGNOSIS — M54.12 CERVICAL RADICULOPATHY: ICD-10-CM

## 2022-05-11 DIAGNOSIS — M75.101 ROTATOR CUFF SYNDROME, RIGHT: Primary | ICD-10-CM

## 2022-05-11 DIAGNOSIS — M25.511 TRIGGER POINT OF RIGHT SHOULDER REGION: ICD-10-CM

## 2022-05-11 PROCEDURE — 3017F COLORECTAL CA SCREEN DOC REV: CPT | Performed by: PHYSICIAN ASSISTANT

## 2022-05-11 PROCEDURE — G8427 DOCREV CUR MEDS BY ELIG CLIN: HCPCS | Performed by: PHYSICIAN ASSISTANT

## 2022-05-11 PROCEDURE — 99213 OFFICE O/P EST LOW 20 MIN: CPT | Performed by: PHYSICIAN ASSISTANT

## 2022-05-11 PROCEDURE — G8417 CALC BMI ABV UP PARAM F/U: HCPCS | Performed by: PHYSICIAN ASSISTANT

## 2022-05-11 PROCEDURE — 1036F TOBACCO NON-USER: CPT | Performed by: PHYSICIAN ASSISTANT

## 2022-05-11 RX ORDER — TIZANIDINE 4 MG/1
4 TABLET ORAL 3 TIMES DAILY
Qty: 30 TABLET | Refills: 0 | Status: SHIPPED | OUTPATIENT
Start: 2022-05-11 | End: 2022-05-21

## 2022-05-11 RX ORDER — METHYLPREDNISOLONE 4 MG/1
TABLET ORAL
Qty: 1 KIT | Refills: 0 | Status: SHIPPED | OUTPATIENT
Start: 2022-05-11

## 2022-05-11 ASSESSMENT — ENCOUNTER SYMPTOMS
COUGH: 0
SHORTNESS OF BREATH: 0
DIARRHEA: 0
ABDOMINAL DISTENTION: 0
ABDOMINAL PAIN: 0
COLOR CHANGE: 0
VOMITING: 0
CHEST TIGHTNESS: 0
RESPIRATORY NEGATIVE: 1
APNEA: 0
NAUSEA: 0
CONSTIPATION: 0

## 2022-05-11 NOTE — PATIENT INSTRUCTIONS
Patient Education        Neck: Exercises  Introduction  Here are some examples of exercises for you to try. The exercises may be suggested for a condition or for rehabilitation. Start each exercise slowly. Ease off the exercises if you start to have pain. You will be told when to start these exercises and which ones will work bestfor you. How to do the exercises  Neck stretches to the side    1. This stretch works best if you keep your shoulder down as you lean away from it. To help you remember to do this, start by relaxing your shoulders and lightly holding on to your thighs or your chair. 2. Tilt your head toward your shoulder and hold for 15 to 30 seconds. Let the weight of your head stretch your muscles. 3. Repeat 2 to 4 times toward each shoulder. Chin tuck    1. Lie on the floor with a rolled-up towel under your neck. Your head should be touching the floor. 2. Slowly bring your chin toward your chest.  3. Hold for a count of 6, and then relax for up to 10 seconds. 4. Repeat 8 to 12 times. Active cervical rotation    1. Sit in a firm chair, or stand up straight. 2. Keeping your chin level, turn your head to the right, and hold for 15 to 30 seconds. 3. Turn your head to the left and hold for 15 to 30 seconds. 4. Repeat 2 to 4 times to each side. Shoulder blade squeeze    1. While standing, squeeze your shoulder blades together. 2. Do not raise your shoulders up as you are squeezing. 3. Hold for 6 seconds. 4. Repeat 8 to 12 times. Shoulder rolls    1. Sit comfortably with your feet shoulder-width apart. You can also do this exercise standing up. 2. Roll your shoulders up, then back, and then down in a smooth, circular motion. 3. Repeat 2 to 4 times. Follow-up care is a key part of your treatment and safety. Be sure to make and go to all appointments, and call your doctor if you are having problems. It's also a good idea to know your test results and keep alist of the medicines you take.   Where can you learn more? Go to https://chpepiceweb.RupeeTimes. org and sign in to your [employer] account. Enter E858 in the [facility] box to learn more about \"Neck Arthritis: Exercises. \"     If you do not have an account, please click on the \"Sign Up Now\" link. Current as of: July 1, 2021               Content Version: 13.2  © 2006-2022 [employer]. Care instructions adapted under license by [facility] ([facility]). If you have questions about a medical condition or this instruction, always ask your healthcare professional. [place] [address] any warranty or liability for your use of this information. Patient Education        Rotator Cuff: Exercises  Introduction  Here are some examples of exercises for you to try. The exercises may be suggested for a condition or for rehabilitation. Start each exercise slowly. Ease off the exercises if you start to have pain. You will be told when to start these exercises and which ones will work bestfor you. How to do the exercises  Pendulum swing    If you have pain in your back, do not do this exercise. 4. Hold on to a table or the back of a chair with your good arm. Then bend forward a little and let your sore arm hang straight down. This exercise does not use the arm muscles. Rather, use your legs and your hips to create movement that makes your arm swing freely. 5. Use the movement from your hips and legs to guide the slightly swinging arm back and forth like a pendulum (or elephant trunk). Then guide it in circles that start small (about the size of a dinner plate). Make the circles a bit larger each day, as your pain allows. 6. Do this exercise for 5 minutes, 5 to 7 times each day. 7. As you have less pain, try bending over a little farther to do this exercise. This will increase the amount of movement at your shoulder. Posterior stretching exercise    5. Hold the elbow of your injured arm with your other hand.   6. Use your hand to pull your injured arm gently up and across your body. You will feel a gentle stretch across the back of your injured shoulder. 7. Hold for at least 15 to 30 seconds. Then slowly lower your arm. 8. Repeat 2 to 4 times. Up-the-back stretch    Your doctor or physical therapist may want you to wait to do this stretch until you have regained most of your range of motion and strength. You can do this stretch in different ways. Hold any of these stretches for at least 15 to 30seconds. Repeat them 2 to 4 times. 5. Light stretch: Put your hand in your back pocket. Let it rest there to stretch your shoulder. 6. Moderate stretch: With your other hand, hold your injured arm (palm outward) behind your back by the wrist. Pull your arm up gently to stretch your shoulder. 7. Advanced stretch: Put a towel over your other shoulder. Put the hand of your injured arm behind your back. Now hold the back end of the towel. With the other hand, hold the front end of the towel in front of your body. Pull gently on the front end of the towel. This will bring your hand farther up your back to stretch your shoulder. Overhead stretch    5. Standing about an arm's length away, grasp onto a solid surface. You could use a countertop, a doorknob, or the back of a sturdy chair. 6. With your knees slightly bent, bend forward with your arms straight. Lower your upper body, and let your shoulders stretch. 7. As your shoulders are able to stretch farther, you may need to take a step or two backward. 8. Hold for at least 15 to 30 seconds. Then stand up and relax. If you had stepped back during your stretch, step forward so you can keep your hands on the solid surface. 9. Repeat 2 to 4 times. Shoulder flexion (lying down)    To make a wand for this exercise, use a piece of PVC pipe or a broom handlewith the broom removed. Make the wand about a foot wider than your shoulders. 4. Lie on your back, holding a wand with both hands.  Your palms should face down as you hold the wand. 5. Keeping your elbows straight, slowly raise your arms over your head. Raise them until you feel a stretch in your shoulders, upper back, and chest.  6. Hold for 15 to 30 seconds. 7. Repeat 2 to 4 times. Shoulder rotation (lying down)    To make a wand for this exercise, use a piece of PVC pipe or a broom handlewith the broom removed. Make the wand about a foot wider than your shoulders. 1. Lie on your back. Hold a wand with both hands with your elbows bent and palms up. 2. Keep your elbows close to your body, and move the wand across your body toward the sore arm. 3. Hold for 8 to 12 seconds. 4. Repeat 2 to 4 times. Wall climbing (to the side)    Avoid any movement that is straight to your side, and be careful not to archyour back. Your arm should stay about 30 degrees to the front of your side. 1. Stand with your side to a wall so that your fingers can just touch it at an angle about 30 degrees toward the front of your body. 2. Walk the fingers of your injured arm up the wall as high as pain permits. Try not to shrug your shoulder up toward your ear as you move your arm up. 3. Hold that position for a count of at least 15 to 20.  4. Walk your fingers back down to the starting position. 5. Repeat at least 2 to 4 times. Try to reach higher each time. Wall climbing (to the front)    During this stretching exercise, be careful not to arch your back. 1. Face a wall, and stand so your fingers can just touch it. 2. Keeping your shoulder down, walk the fingers of your injured arm up the wall as high as pain permits. (Don't shrug your shoulder up toward your ear.)  3. Hold your arm in that position for at least 15 to 30 seconds. 4. Slowly walk your fingers back down to where you started. 5. Repeat at least 2 to 4 times. Try to reach higher each time. Shoulder blade squeeze    1. Stand with your arms at your sides, and squeeze your shoulder blades together.  Do not raise your shoulders up as you squeeze. 2. Hold 6 seconds. 3. Repeat 8 to 12 times. Scapular exercise: Arm reach    1. Lie flat on your back. This exercise is a very slight motion that starts with your arms raised (elbows straight, arms straight). 2. From this position, reach higher toward the arianna or ceiling. Keep your elbows straight. All motion should be from your shoulder blade only. 3. Relax your arms back to where you started. 4. Repeat 8 to 12 times. Arm raise to the side    During this strengthening exercise, your arm should stay about 30 degrees tothe front of your side. 1. Slowly raise your injured arm to the side, with your thumb facing up. Raise your arm 60 degrees at the most (shoulder level is 90 degrees). 2. Hold the position for 3 to 5 seconds. Then lower your arm back to your side. If you need to, bring your \"good\" arm across your body and place it under the elbow as you lower your injured arm. Use your good arm to keep your injured arm from dropping down too fast.  3. Repeat 8 to 12 times. 4. When you first start out, don't hold any extra weight in your hand. As you get stronger, you may use a 1-pound to 2-pound dumbbell or a small can of food. Shoulder flexor and extensor exercise    These are isometric exercises. That means you contract your muscles withoutactually moving. 1. Push forward (flex): Stand facing a wall or doorjamb, about 6 inches or less back. Hold your injured arm against your body. Make a closed fist with your thumb on top. Then gently push your hand forward into the wall with about 25% to 50% of your strength. Don't let your body move backward as you push. Hold for about 6 seconds. Relax for a few seconds. Repeat 8 to 12 times. 2. Push backward (extend): Stand with your back flat against a wall. Your upper arm should be against the wall, with your elbow bent 90 degrees (your hand straight ahead).  Push your elbow gently back against the wall with about 25% to 50% of your strength. Don't let your body move forward as you push. Hold for about 6 seconds. Relax for a few seconds. Repeat 8 to 12 times. Scapular exercise: Wall push-ups    This exercise is best done with your fingers somewhat turned out, rather thanstraight up and down. 1. Stand facing a wall, about 12 inches to 18 inches away. 2. Place your hands on the wall at shoulder height. 3. Slowly bend your elbows and bring your face to the wall. Keep your back and hips straight. 4. Push back to where you started. 5. Repeat 8 to 12 times. 6. When you can do this exercise against a wall comfortably, you can try it against a counter. You can then slowly progress to the end of a couch, then to a sturdy chair, and finally to the floor. Scapular exercise: Retraction    For this exercise, you will need elastic exercise material, such as surgicaltubing or Thera-Band. 1. Put the band around a solid object at about waist level. (A bedpost will work well.) Each hand should hold an end of the band. 2. With your elbows at your sides and bent to 90 degrees, pull the band back. Your shoulder blades should move toward each other. Then move your arms back where you started. 3. Repeat 8 to 12 times. 4. If you have good range of motion in your shoulders, try this exercise with your arms lifted out to the sides. Keep your elbows at a 90-degree angle. Raise the elastic band up to about shoulder level. Pull the band back to move your shoulder blades toward each other. Then move your arms back where you started. Internal rotator strengthening exercise    1. Start by tying a piece of elastic exercise material to a doorknob. You can use surgical tubing or Thera-Band. 2. Stand or sit with your shoulder relaxed and your elbow bent 90 degrees. Your upper arm should rest comfortably against your side. Squeeze a rolled towel between your elbow and your body for comfort. This will help keep your arm at your side.   3. Hold one end of the elastic band in the hand of the painful arm. 4. Slowly rotate your forearm toward your body until it touches your belly. Slowly move it back to where you started. 5. Keep your elbow and upper arm firmly tucked against the towel roll or at your side. 6. Repeat 8 to 12 times. External rotator strengthening exercise    1. Start by tying a piece of elastic exercise material to a doorknob. You can use surgical tubing or Thera-Band. (You may also hold one end of the band in each hand.)  2. Stand or sit with your shoulder relaxed and your elbow bent 90 degrees. Your upper arm should rest comfortably against your side. Squeeze a rolled towel between your elbow and your body for comfort. This will help keep your arm at your side. 3. Hold one end of the elastic band with the hand of the painful arm. 4. Start with your forearm across your belly. Slowly rotate the forearm out away from your body. Keep your elbow and upper arm tucked against the towel roll or the side of your body until you begin to feel tightness in your shoulder. Slowly move your arm back to where you started. 5. Repeat 8 to 12 times. Follow-up care is a key part of your treatment and safety. Be sure to make and go to all appointments, and call your doctor if you are having problems. It's also a good idea to know your test results and keep alist of the medicines you take. Where can you learn more? Go to https://CMOSIS nv.TrunqShow. org and sign in to your [email or web address] account. Enter Tiffanie Cool in the [facility] box to learn more about \"Rotator Cuff: Exercises. \"     If you do not have an account, please click on the \"Sign Up Now\" link. Current as of: July 1, 2021               Content Version: 13.2  © 2006-2022 [employer]. Care instructions adapted under license by [facility] ([facility]).  If you have questions about a medical condition or this instruction, always ask your healthcare professional. [address] any warranty or liability for your use of this information.

## 2022-06-24 ENCOUNTER — TELEMEDICINE (OUTPATIENT)
Dept: FAMILY MEDICINE CLINIC | Age: 52
End: 2022-06-24
Payer: COMMERCIAL

## 2022-06-24 DIAGNOSIS — Z13.1 DIABETES MELLITUS SCREENING: ICD-10-CM

## 2022-06-24 DIAGNOSIS — I10 ESSENTIAL HYPERTENSION: Primary | ICD-10-CM

## 2022-06-24 DIAGNOSIS — F41.9 ANXIETY: ICD-10-CM

## 2022-06-24 PROCEDURE — G8427 DOCREV CUR MEDS BY ELIG CLIN: HCPCS | Performed by: FAMILY MEDICINE

## 2022-06-24 PROCEDURE — 99213 OFFICE O/P EST LOW 20 MIN: CPT | Performed by: FAMILY MEDICINE

## 2022-06-24 PROCEDURE — 3017F COLORECTAL CA SCREEN DOC REV: CPT | Performed by: FAMILY MEDICINE

## 2022-06-24 RX ORDER — ESCITALOPRAM OXALATE 20 MG/1
20 TABLET ORAL DAILY
Qty: 90 TABLET | Refills: 1 | Status: SHIPPED | OUTPATIENT
Start: 2022-06-24 | End: 2022-08-17

## 2022-06-24 RX ORDER — FEXOFENADINE HCL 180 MG/1
180 TABLET ORAL DAILY
Qty: 90 TABLET | Refills: 1 | Status: SHIPPED | OUTPATIENT
Start: 2022-06-24 | End: 2023-06-24

## 2022-06-24 SDOH — ECONOMIC STABILITY: FOOD INSECURITY: WITHIN THE PAST 12 MONTHS, THE FOOD YOU BOUGHT JUST DIDN'T LAST AND YOU DIDN'T HAVE MONEY TO GET MORE.: NEVER TRUE

## 2022-06-24 SDOH — ECONOMIC STABILITY: FOOD INSECURITY: WITHIN THE PAST 12 MONTHS, YOU WORRIED THAT YOUR FOOD WOULD RUN OUT BEFORE YOU GOT MONEY TO BUY MORE.: NEVER TRUE

## 2022-06-24 ASSESSMENT — PATIENT HEALTH QUESTIONNAIRE - PHQ9
1. LITTLE INTEREST OR PLEASURE IN DOING THINGS: 0
SUM OF ALL RESPONSES TO PHQ QUESTIONS 1-9: 0
SUM OF ALL RESPONSES TO PHQ9 QUESTIONS 1 & 2: 0
SUM OF ALL RESPONSES TO PHQ QUESTIONS 1-9: 0
2. FEELING DOWN, DEPRESSED OR HOPELESS: 0

## 2022-06-24 ASSESSMENT — SOCIAL DETERMINANTS OF HEALTH (SDOH): HOW HARD IS IT FOR YOU TO PAY FOR THE VERY BASICS LIKE FOOD, HOUSING, MEDICAL CARE, AND HEATING?: NOT HARD AT ALL

## 2022-06-24 NOTE — PROGRESS NOTES
General FM note    TeleHealth encounter -- Audio/Visual (During OHAFL-05 public health emergency)    Hira Knight is a 46 y.o. female who presents today for follow up on her  medical conditions as noted below. Hira Knight is c/o of   Chief Complaint   Patient presents with    Medication Refill     Kaykay Butcher       Patient Active Problem List:     Hypothyroidism     Insulin resistance     Right knee pain     History of endometriosis     Proctalgia fugax     Multiple nevi     Edema of both legs     History of Osgood-Schlatter disease     Breast pain     Hypertrophy of breast     Pain in neck     Past Medical History:   Diagnosis Date    Gout     Hypothyroidism     Insulin resistance     Diagnosed in 2000      Past Surgical History:   Procedure Laterality Date    CYST REMOVAL  2000    FOOT SURGERY  10/2016    HYSTERECTOMY  12/2008    pt has right ovary    LAPAROSCOPY       Family History   Problem Relation Age of Onset    Cancer Mother     Diabetes Father     High Blood Pressure Father     Arthritis Father     Other Sister         BRAIN TUMOR    High Blood Pressure Maternal Grandmother     Cancer Maternal Grandfather         PROSTATE    Diabetes Maternal Grandfather     High Blood Pressure Paternal Grandmother      Current Outpatient Medications   Medication Sig Dispense Refill    escitalopram (LEXAPRO) 20 MG tablet Take 1 tablet by mouth daily 90 tablet 1    fexofenadine (ALLEGRA) 180 MG tablet Take 1 tablet by mouth daily 90 tablet 1    methylPREDNISolone (MEDROL DOSEPACK) 4 MG tablet Take by mouth.  1 kit 0    naproxen (NAPROSYN) 500 MG tablet take 1 tablet by mouth twice a day with meals 180 tablet 0    diclofenac sodium (VOLTAREN) 1 % GEL Apply 2 g topically 4 times daily as needed for Pain 2 g 0    citalopram (CELEXA) 10 MG tablet take 1 tablet by mouth once daily      magnesium citrate solution Take 600 mLs by mouth once      halobetasol (ULTRAVATE) 0.05 % ointment apply to affected area twice a day 50 g 1    diclofenac sodium (VOLTAREN) 1 % GEL Apply 2 g topically 2 times daily 100 g 0    valsartan (DIOVAN) 160 MG tablet take 1 tablet by mouth once daily 30 tablet 5    hydroCHLOROthiazide (HYDRODIURIL) 25 MG tablet take 1/2 tablet by mouth once daily 45 tablet 1    ranitidine (ZANTAC) 150 MG tablet take 1 tablet by mouth twice a day 60 tablet 3    cyclobenzaprine (FLEXERIL) 5 MG tablet take 1 tablet by mouth twice a day if needed for muscle spasm 180 tablet 0    fluticasone (FLONASE) 50 MCG/ACT nasal spray 1 spray by Nasal route daily 16 g 3    ergocalciferol (ERGOCALCIFEROL) 56229 units capsule Take 1 capsule by mouth once a week 4 capsule 3    etodolac (LODINE) 400 MG tablet take 1 tablet by mouth twice a day  0    cetirizine (ZYRTEC) 1 MG/ML syrup Take 5 mLs by mouth daily 350 mL 2    Multiple Vitamins-Minerals (THERAPEUTIC MULTIVITAMIN-MINERALS) tablet Take 1 tablet by mouth daily      atorvastatin (LIPITOR) 20 MG tablet take 1 tablet by mouth once daily (Patient not taking: Reported on 5/11/2022)      metFORMIN (GLUCOPHAGE-XR) 500 MG extended release tablet Take 2 tablets by mouth daily (with breakfast) (Patient not taking: Reported on 5/11/2022) 60 tablet 5    furosemide (LASIX) 40 MG tablet take 1 tablet by mouth once daily (Patient not taking: Reported on 5/11/2022) 30 tablet 1    levothyroxine (SYNTHROID) 25 MCG tablet take 1 tablet by mouth once daily (Patient not taking: Reported on 5/11/2022) 90 tablet 0     No current facility-administered medications for this visit.      ALLERGIES:    Allergies   Allergen Reactions    Banana Anaphylaxis and Hives    Ioxaglate Shortness Of Breath    Iv Contrast [Iodides] Shortness Of Breath    Molds & Smuts Anaphylaxis and Hives    Mushroom Extract Complex Anaphylaxis and Hives    Nut [Peanut-Containing Drug Products] Anaphylaxis    Peanut Oil Anaphylaxis and Hives    Bactrim [Sulfamethoxazole-Trimethoprim] Other (See Comments) and Swelling     Stomach swelling  GI DISTURBANCE    Roel [employer] Peroxide] Swelling    Nickel        Social History     Tobacco Use    Smoking status: Never Smoker    Smokeless tobacco: Never Used   Substance Use Topics    Alcohol use: No     Alcohol/week: 0.0 standard drinks      There is no height or weight on file to calculate BMI. There were no vitals taken for this visit. Subjective:      HPI    46 y.o. female reaching out per tele med visit today. The patient states that she was prescribed by nurse practitioner Lexapro 20 mg a day and it has helped her greatly with her anxiety. She would like to have a refill done. She also would like to have a refill done for her neck for her. She really does not have any concerns at all. She lost over almost 70 pounds since her weight loss surgery. She is doing well. Blood pressure is quite down. Review of Systems   Constitutional: Negative for fever and unexpected weight change. Objective:   Physical Exam  General appearance: normal development, habitus and attention, no deformities. No distress. Pulmo: normal breathing pattern. Psychiatric: alert and oriented to place, time and person. Normal mood and affect. Assessment:       Diagnosis Orders   1. Essential hypertension     2. Anxiety  escitalopram (LEXAPRO) 20 MG tablet   3. Diabetes mellitus screening  Hemoglobin A1C       Plan:   Patient's blood pressure looks quite good. After we will take of the diagnosis of hypertension when she returns to the office. Lexapro refilled for anxiety. Await results of the A1c but I do not expect a abnormal result as the patient has lost a lot of weight. Call or return to clinic prn if these symptoms worsen or fail to improve as anticipated. I have reviewed the instructions with the patient, answering all questions to her satisfaction.   Jaun Serrano is a 46 y.o. female being evaluated by a Virtual Visit (video visit) encounter to address concerns as mentioned above. A caregiver was present when appropriate. Due to this being a TeleHealth encounter (During IXJKY-89 public health emergency), evaluation of the following organ systems was limited: Vitals/Constitutional/EENT/Resp/CV/GI//MS/Neuro/Skin/Heme-Lymph-Imm. Pursuant to the emergency declaration under the [facility], [address] [place] [address] authority and the [employer] Act, this Virtual Visit was conducted with patient's (and/or legal guardian's) consent, to reduce the patient's risk of exposure to COVID-19 and provide necessary medical care. The patient (and/or legal guardian) has also been advised to contact this office for worsening conditions or problems, and seek emergency medical treatment and/or call 911 if deemed necessary. Patient identification was verified at the start of the visit: Yes    Total time spent for this encounter: Not billed by time    Services were provided through a video synchronous discussion virtually to substitute for in-person clinic visit. Patient and provider were located at their individual homes. --Betty Shoemaker MD on 6/24/2022 at 7:42 AM    An electronic signature was used to authenticate this note. Return in about 6 months (around 12/24/2022), or if symptoms worsen or fail to improve.   Orders Placed This Encounter   Procedures    Hemoglobin A1C     Standing Status:   Future     Standing Expiration Date:   6/24/2023     Orders Placed This Encounter   Medications    escitalopram (LEXAPRO) 20 MG tablet     Sig: Take 1 tablet by mouth daily     Dispense:  90 tablet     Refill:  1    fexofenadine (ALLEGRA) 180 MG tablet     Sig: Take 1 tablet by mouth daily     Dispense:  90 tablet     Refill:  1       (Please note that portions of this note were completed with a voice recognition program. Efforts were made to edit the dictations but occasionally words are mis-transcribed.)

## 2022-08-17 ENCOUNTER — TELEPHONE (OUTPATIENT)
Dept: FAMILY MEDICINE CLINIC | Age: 52
End: 2022-08-17

## 2022-08-17 DIAGNOSIS — F41.9 ANXIETY: Primary | ICD-10-CM

## 2022-08-17 RX ORDER — DESVENLAFAXINE 25 MG/1
25 TABLET, EXTENDED RELEASE ORAL DAILY
Qty: 30 TABLET | Refills: 1 | Status: SHIPPED | OUTPATIENT
Start: 2022-08-17

## 2022-08-17 NOTE — TELEPHONE ENCOUNTER
Patient called in stating she would like something else called in other [facility]  she stated it is not helping her . Kyra Wallace       Please advise

## 2022-12-13 ENCOUNTER — PATIENT MESSAGE (OUTPATIENT)
Dept: FAMILY MEDICINE CLINIC | Age: 52
End: 2022-12-13

## 2022-12-13 RX ORDER — VALACYCLOVIR HYDROCHLORIDE 1 G/1
1000 TABLET, FILM COATED ORAL 2 TIMES DAILY
Qty: 14 TABLET | Refills: 3 | Status: SHIPPED | OUTPATIENT
Start: 2022-12-13 | End: 2022-12-20

## 2022-12-13 NOTE — TELEPHONE ENCOUNTER
From: Olamide Du  To: Dr. Catie Tinsley  Sent: 12/13/2022 1:25 PM EST  Subject: Medication need     Hello. May I start daily valacyclovir to help with outbreaks please?

## 2022-12-21 ENCOUNTER — OFFICE VISIT (OUTPATIENT)
Dept: FAMILY MEDICINE CLINIC | Age: 52
End: 2022-12-21
Payer: COMMERCIAL

## 2022-12-21 ENCOUNTER — HOSPITAL ENCOUNTER (OUTPATIENT)
Age: 52
Setting detail: SPECIMEN
Discharge: HOME | End: 2022-12-21

## 2022-12-21 VITALS
DIASTOLIC BLOOD PRESSURE: 82 MMHG | SYSTOLIC BLOOD PRESSURE: 135 MMHG | HEART RATE: 85 BPM | BODY MASS INDEX: 28.64 KG/M2 | WEIGHT: 189 LBS | HEIGHT: 68 IN | OXYGEN SATURATION: 99 % | TEMPERATURE: 97.5 F

## 2022-12-21 DIAGNOSIS — I10 ESSENTIAL HYPERTENSION: Primary | ICD-10-CM

## 2022-12-21 DIAGNOSIS — Z23 NEED FOR INFLUENZA VACCINATION: ICD-10-CM

## 2022-12-21 DIAGNOSIS — Z13.220 ENCOUNTER FOR LIPID SCREENING FOR CARDIOVASCULAR DISEASE: ICD-10-CM

## 2022-12-21 DIAGNOSIS — Z13.6 ENCOUNTER FOR LIPID SCREENING FOR CARDIOVASCULAR DISEASE: ICD-10-CM

## 2022-12-21 DIAGNOSIS — Z12.31 ENCOUNTER FOR SCREENING MAMMOGRAM FOR MALIGNANT NEOPLASM OF BREAST: ICD-10-CM

## 2022-12-21 DIAGNOSIS — Z13.1 DIABETES MELLITUS SCREENING: ICD-10-CM

## 2022-12-21 LAB
ABSOLUTE EOS #: 0.21 K/UL (ref 0–0.44)
ABSOLUTE IMMATURE GRANULOCYTE: <0.03 K/UL (ref 0–0.3)
ABSOLUTE LYMPH #: 2.28 K/UL (ref 1.1–3.7)
ABSOLUTE MONO #: 0.44 K/UL (ref 0.1–1.2)
ALBUMIN SERPL-MCNC: 3.9 G/DL (ref 3.5–5.2)
ALBUMIN/GLOBULIN RATIO: 1.2 (ref 1–2.5)
ALP BLD-CCNC: 65 U/L (ref 35–104)
ALT SERPL-CCNC: 9 U/L (ref 5–33)
ANION GAP SERPL CALCULATED.3IONS-SCNC: 10 MMOL/L (ref 9–17)
AST SERPL-CCNC: 13 U/L
BASOPHILS # BLD: 1 % (ref 0–2)
BASOPHILS ABSOLUTE: 0.06 K/UL (ref 0–0.2)
BILIRUB SERPL-MCNC: 0.7 MG/DL (ref 0.3–1.2)
BUN BLDV-MCNC: 10 MG/DL (ref 6–20)
CALCIUM SERPL-MCNC: 9 MG/DL (ref 8.6–10.4)
CHLORIDE BLD-SCNC: 101 MMOL/L (ref 98–107)
CHOLESTEROL/HDL RATIO: 3.1
CHOLESTEROL: 212 MG/DL
CO2: 27 MMOL/L (ref 20–31)
CREAT SERPL-MCNC: 0.78 MG/DL (ref 0.5–0.9)
EOSINOPHILS RELATIVE PERCENT: 4 % (ref 1–4)
FERRITIN: 161 NG/ML (ref 13–150)
FOLATE: 12 NG/ML
GFR SERPL CREATININE-BSD FRML MDRD: >60 ML/MIN/1.73M2
GLUCOSE BLD-MCNC: 87 MG/DL (ref 70–99)
HBA1C MFR BLD: 4.8 %
HCT VFR BLD CALC: 42.7 % (ref 36.3–47.1)
HDLC SERPL-MCNC: 69 MG/DL
HEMOGLOBIN: 13.5 G/DL (ref 11.9–15.1)
IMMATURE GRANULOCYTES: 0 %
IRON SATURATION: 44 % (ref 20–55)
IRON: 121 UG/DL (ref 37–145)
LDL CHOLESTEROL: 133 MG/DL (ref 0–130)
LYMPHOCYTES # BLD: 40 % (ref 24–43)
MAGNESIUM: 2.1 MG/DL (ref 1.6–2.6)
MCH RBC QN AUTO: 29.7 PG (ref 25.2–33.5)
MCHC RBC AUTO-ENTMCNC: 31.6 G/DL (ref 28.4–34.8)
MCV RBC AUTO: 94.1 FL (ref 82.6–102.9)
MONOCYTES # BLD: 8 % (ref 3–12)
NRBC AUTOMATED: 0 PER 100 WBC
PDW BLD-RTO: 11.4 % (ref 11.8–14.4)
PHOSPHORUS: 3.3 MG/DL (ref 2.6–4.5)
PLATELET # BLD: 359 K/UL (ref 138–453)
PMV BLD AUTO: 8.8 FL (ref 8.1–13.5)
POTASSIUM SERPL-SCNC: 3.8 MMOL/L (ref 3.7–5.3)
RBC # BLD: 4.54 M/UL (ref 3.95–5.11)
SEG NEUTROPHILS: 47 % (ref 36–65)
SEGMENTED NEUTROPHILS ABSOLUTE COUNT: 2.67 K/UL (ref 1.5–8.1)
SODIUM BLD-SCNC: 138 MMOL/L (ref 135–144)
TOTAL IRON BINDING CAPACITY: 278 UG/DL (ref 250–450)
TOTAL PROTEIN: 7.1 G/DL (ref 6.4–8.3)
TRIGL SERPL-MCNC: 51 MG/DL
UNSATURATED IRON BINDING CAPACITY: 157 UG/DL (ref 112–347)
VITAMIN B-12: 534 PG/ML (ref 232–1245)
VITAMIN D 25-HYDROXY: 22.4 NG/ML
WBC # BLD: 5.7 K/UL (ref 3.5–11.3)

## 2022-12-21 PROCEDURE — 90471 IMMUNIZATION ADMIN: CPT | Performed by: FAMILY MEDICINE

## 2022-12-21 PROCEDURE — 90674 CCIIV4 VAC NO PRSV 0.5 ML IM: CPT | Performed by: FAMILY MEDICINE

## 2022-12-21 PROCEDURE — 99213 OFFICE O/P EST LOW 20 MIN: CPT | Performed by: FAMILY MEDICINE

## 2022-12-21 PROCEDURE — 3017F COLORECTAL CA SCREEN DOC REV: CPT | Performed by: FAMILY MEDICINE

## 2022-12-21 PROCEDURE — G8417 CALC BMI ABV UP PARAM F/U: HCPCS | Performed by: FAMILY MEDICINE

## 2022-12-21 PROCEDURE — G8427 DOCREV CUR MEDS BY ELIG CLIN: HCPCS | Performed by: FAMILY MEDICINE

## 2022-12-21 PROCEDURE — G8482 FLU IMMUNIZE ORDER/ADMIN: HCPCS | Performed by: FAMILY MEDICINE

## 2022-12-21 PROCEDURE — 1036F TOBACCO NON-USER: CPT | Performed by: FAMILY MEDICINE

## 2022-12-21 PROCEDURE — 83036 HEMOGLOBIN GLYCOSYLATED A1C: CPT | Performed by: FAMILY MEDICINE

## 2022-12-21 PROCEDURE — 3078F DIAST BP <80 MM HG: CPT | Performed by: FAMILY MEDICINE

## 2022-12-21 PROCEDURE — 3074F SYST BP LT 130 MM HG: CPT | Performed by: FAMILY MEDICINE

## 2022-12-21 ASSESSMENT — PATIENT HEALTH QUESTIONNAIRE - PHQ9
SUM OF ALL RESPONSES TO PHQ QUESTIONS 1-9: 0
SUM OF ALL RESPONSES TO PHQ9 QUESTIONS 1 & 2: 0
SUM OF ALL RESPONSES TO PHQ QUESTIONS 1-9: 0
2. FEELING DOWN, DEPRESSED OR HOPELESS: 0
1. LITTLE INTEREST OR PLEASURE IN DOING THINGS: 0

## 2022-12-21 NOTE — PROGRESS NOTES
General FM note    Chucho Tam is a 46 y.o. female who presents today for follow up on her  medical conditions as noted below. Chucho Tam is c/o of   Chief Complaint   Patient presents with    Annual Exam       Patient Active Problem List:     Hypothyroidism     Insulin resistance     Right knee pain     History of endometriosis     Proctalgia fugax     Multiple nevi     Edema of both legs     History of Osgood-Schlatter disease     Breast pain     Hypertrophy of breast     Pain in neck     Past Medical History:   Diagnosis Date    Gout     Hypothyroidism     Insulin resistance     Diagnosed in 2000      Past Surgical History:   Procedure Laterality Date    CYST REMOVAL  2000    FOOT SURGERY  10/2016    HYSTERECTOMY  12/2008    pt has right ovary    LAPAROSCOPY       Family History   Problem Relation Age of Onset    Cancer Mother     Diabetes Father     High Blood Pressure Father     Arthritis Father     Other Sister         BRAIN TUMOR    High Blood Pressure Maternal Grandmother     Cancer Maternal Grandfather         PROSTATE    Diabetes Maternal Grandfather     High Blood Pressure Paternal Grandmother      Current Outpatient Medications   Medication Sig Dispense Refill    desvenlafaxine succinate (PRISTIQ) 25 MG TB24 extended release tablet Take 1 tablet by mouth daily 30 tablet 1    fexofenadine (ALLEGRA) 180 MG tablet Take 1 tablet by mouth daily 90 tablet 1    methylPREDNISolone (MEDROL DOSEPACK) 4 MG tablet Take by mouth.  1 kit 0    naproxen (NAPROSYN) 500 MG tablet take 1 tablet by mouth twice a day with meals 180 tablet 0    diclofenac sodium (VOLTAREN) 1 % GEL Apply 2 g topically 4 times daily as needed for Pain 2 g 0    magnesium citrate solution Take 600 mLs by mouth once      halobetasol (ULTRAVATE) 0.05 % ointment apply to affected area twice a day 50 g 1    diclofenac sodium (VOLTAREN) 1 % GEL Apply 2 g topically 2 times daily 100 g 0    valsartan (DIOVAN) 160 MG tablet take 1 tablet by mouth once daily 30 tablet 5    hydroCHLOROthiazide (HYDRODIURIL) 25 MG tablet take 1/2 tablet by mouth once daily 45 tablet 1    ranitidine (ZANTAC) 150 MG tablet take 1 tablet by mouth twice a day 60 tablet 3    cyclobenzaprine (FLEXERIL) 5 MG tablet take 1 tablet by mouth twice a day if needed for muscle spasm 180 tablet 0    fluticasone (FLONASE) 50 MCG/ACT nasal spray 1 spray by Nasal route daily 16 g 3    ergocalciferol (ERGOCALCIFEROL) 80615 units capsule Take 1 capsule by mouth once a week 4 capsule 3    etodolac (LODINE) 400 MG tablet take 1 tablet by mouth twice a day  0    cetirizine (ZYRTEC) 1 MG/ML syrup Take 5 mLs by mouth daily 350 mL 2    Multiple Vitamins-Minerals (THERAPEUTIC MULTIVITAMIN-MINERALS) tablet Take 1 tablet by mouth daily      atorvastatin (LIPITOR) 20 MG tablet take 1 tablet by mouth once daily (Patient not taking: No sig reported)      metFORMIN (GLUCOPHAGE-XR) 500 MG extended release tablet Take 2 tablets by mouth daily (with breakfast) (Patient not taking: No sig reported) 60 tablet 5    furosemide (LASIX) 40 MG tablet take 1 tablet by mouth once daily (Patient not taking: No sig reported) 30 tablet 1    levothyroxine (SYNTHROID) 25 MCG tablet take 1 tablet by mouth once daily (Patient not taking: No sig reported) 90 tablet 0     No current facility-administered medications for this visit.      ALLERGIES:    Allergies   Allergen Reactions    Banana Anaphylaxis and Hives    Ioxaglate Shortness Of Breath    Iv Contrast [Iodides] Shortness Of Breath    Molds & Smuts Anaphylaxis and Hives    Mushroom Extract Complex Anaphylaxis and Hives    Nut [Peanut-Containing Drug Products] Anaphylaxis    Peanut Oil Anaphylaxis and Hives    Bactrim [Sulfamethoxazole-Trimethoprim] Other (See Comments) and Swelling     Stomach swelling  GI DISTURBANCE    Roel Per [Benzoyl Peroxide] Swelling    Nickel        Social History     Tobacco Use    Smoking status: Never    Smokeless tobacco: Never   Substance Use Topics    Alcohol use: No     Alcohol/week: 0.0 standard drinks      Body mass index is 28.74 kg/m². /82   Pulse 85   Temp 97.5 °F (36.4 °C)   Ht 5' 8\" (1.727 m)   Wt 189 lb (85.7 kg)   SpO2 99%   BMI 28.74 kg/m²     Subjective:      HPI    46 y.o. female coming today with question about the herpes genitalis. She tested positive for IgG couple years ago. She states that she is  now but she does not feel comfortable starting a new relationship with this in the morning. She has a lot of questions how this is transferred. The patient is status post gastric sleeve surgery and she lost 60 pounds. She states she eats the whole day very small portions but overall she feeling quite well. All her medications were stopped including the cholesterol medication. Blood pressure well controlled. Review of Systems   Constitutional: Negative for fever and unexpected weight change. Pertinent items are noted in HPI. Objective:   Physical Exam  Constitutional: VS (see above). General appearance: normal development, habitus and attention, no deformities. No distress. Eyes: normal conjunctiva and lids. CAV: RRR, no RMG. No edema lower extremities. Pulmo: CTA bilateral, no CWR. Skin: no rashes, lesions or ulcers. Musculoskeletal: normal gait. Nails: no clubbing or cyanosis. Psychiatric: alert and oriented to place, time and person. Normal mood and affect. Assessment:       Diagnosis Orders   1. Essential hypertension        2. Diabetes mellitus screening  POCT glycosylated hemoglobin (Hb A1C)      3. Need for influenza vaccination  Influenza, FLUCELVAX, (age 10 mo+), IM, Preservative Free, 0.5 mL      4. Encounter for lipid screening for cardiovascular disease  Lipid Panel      5. Encounter for screening mammogram for malignant neoplasm of breast  TENA DIGITAL SCREEN W OR WO CAD BILATERAL          Plan:   Blood pressure well controlled. A1c is 4.8 which is wonderful. Flu shot provided.   I discussed with patient at length the diagnosis/ her recent results herpes IgG positive. The patient will call if she needs to start Valtrex when having an outbreak. Return in about 6 months (around 6/21/2023), or if symptoms worsen or fail to improve. Orders Placed This Encounter   Procedures    TENA DIGITAL SCREEN W OR WO CAD BILATERAL     Standing Status:   Future     Standing Expiration Date:   2/21/2024     Scheduling Instructions:      May perform additional studies at the discretion of the radiologist: Breast US, breast MRI, imaging guided biopsy, cyst aspiration or MBI. Influenza, FLUCELVAX, (age 10 mo+), IM, Preservative Free, 0.5 mL    Lipid Panel     Standing Status:   Future     Number of Occurrences:   1     Standing Expiration Date:   12/21/2023     Order Specific Question:   Is Patient Fasting?/# of Hours     Answer:   yes    POCT glycosylated hemoglobin (Hb A1C)     No orders of the defined types were placed in this encounter. Call or return to clinic prn if these symptoms worsen or fail to improve as anticipated. I have reviewed the instructions with the patient, answering all questions to patient's satisfaction. Anamika received counseling on the following healthy behaviors: nutrition, exercise, and medication adherence  Reviewed prior labs and health maintenance. Continue current medications, diet and exercise. Discussed use, benefit, and side effects of prescribed medications. Barriers to medication compliance addressed. Patient given educational materials - see patient instructions. All patient questions answered. Patient voiced understanding.       Electronically signed by Chester Diamond MD on 12/21/2022 at 10:40 AM       (Please note that portions of this note were completed with a voice recognition program. Efforts were made to edit the dictations but occasionally words are mis-transcribed.)

## 2023-01-07 NOTE — FLOWSHEET NOTE
[x] [facility]  [facility]patient [facility] &  Therapy  [facility]   [place]: [phone]  F: [phone]      Physical Therapy Cancel/No Show note    Date: 2021  Patient: Jacob Baeza  : 1970  MRN: [id number]    Cancels/No Shows to date:     For today's appointment patient:    [x]  Cancelled    [] Rescheduled appointment    [] No-show     Reason given by patient:    []  Patient ill    []  Conflicting appointment    [] No transportation      [x] Conflict with work    [] No reason given    [] Weather related    [] [id number]    [] Other:      Comments:        [x] Next appointment was confirmed    Electronically signed by: Mumtaz Hays No

## 2023-01-11 RX ORDER — NAPROXEN 500 MG/1
TABLET ORAL
Qty: 180 TABLET | Refills: 0 | Status: SHIPPED | OUTPATIENT
Start: 2023-01-11

## 2023-01-11 NOTE — TELEPHONE ENCOUNTER
Cal[facility] Mejia is calling to request a refill on the following medication(s):    Last Visit Date (If Applicable):  12/87/1828    Next Visit Date:    Visit date not found    Medication Request:  Requested Prescriptions     Pending Prescriptions Disp Refills    naproxen (NAPROSYN) 500 MG tablet [Pharmacy Med Name: NAPROXEN 500 MG TABLET] 180 tablet 0     Sig: take 1 tablet by mouth twice a day with meals

## 2023-02-08 DIAGNOSIS — F41.9 ANXIETY: ICD-10-CM

## 2023-02-08 NOTE — TELEPHONE ENCOUNTER
[facility] is calling to request a refill on the following medication(s):    Last Visit Date (If Applicable):  03/39/9603    Next Visit Date:    Visit date not found    Medication Request:  Requested Prescriptions     Pending Prescriptions Disp Refills    desvenlafaxine succinate (PRISTIQ) 25 MG TB24 extended release tablet 30 tablet 1     Sig: Take 1 tablet by mouth daily

## 2023-02-09 RX ORDER — DESVENLAFAXINE 25 MG/1
25 TABLET, EXTENDED RELEASE ORAL DAILY
Qty: 30 TABLET | Refills: 1 | Status: SHIPPED | OUTPATIENT
Start: 2023-02-09

## 2023-04-06 DIAGNOSIS — F41.9 ANXIETY: ICD-10-CM

## 2023-04-06 RX ORDER — DESVENLAFAXINE 25 MG/1
TABLET, EXTENDED RELEASE ORAL
Qty: 30 TABLET | Refills: 1 | Status: SHIPPED | OUTPATIENT
Start: 2023-04-06

## 2024-01-02 ENCOUNTER — TELEPHONE (OUTPATIENT)
Dept: FAMILY MEDICINE CLINIC | Age: 54
End: 2024-01-02

## 2024-01-02 DIAGNOSIS — N64.4 BREAST PAIN: Primary | ICD-10-CM

## 2024-01-02 NOTE — TELEPHONE ENCOUNTER
Patient is having bi-lateral Breast pain for 7 days now. Would like an order for a diagnostic mammogram    Order pended, please check the DX & order

## 2024-01-02 NOTE — TELEPHONE ENCOUNTER
----- Message from Sakshi Hahn sent at 1/2/2024 11:45 AM EST -----  Subject: Referral Request    Reason for referral request? Diagnostic mammogram  Provider patient wants to be referred to(if known):     Provider Phone Number(if known):    Additional Information for Provider? Pt needs a referral to get diagnostic   mammogram. She is having breast pain in both breast. Please contact pt.  ---------------------------------------------------------------------------  --------------  CALL BACK INFO    [phone]; OK to leave message on voicemail  ---------------------------------------------------------------------------  --------------

## 2024-01-22 ENCOUNTER — OFFICE VISIT (OUTPATIENT)
Dept: FAMILY MEDICINE CLINIC | Age: 54
End: 2024-01-22
Payer: COMMERCIAL

## 2024-01-22 VITALS
WEIGHT: 223 LBS | TEMPERATURE: 97.9 F | OXYGEN SATURATION: 99 % | HEIGHT: 68 IN | DIASTOLIC BLOOD PRESSURE: 88 MMHG | BODY MASS INDEX: 33.8 KG/M2 | SYSTOLIC BLOOD PRESSURE: 138 MMHG | HEART RATE: 93 BPM

## 2024-01-22 DIAGNOSIS — I10 ESSENTIAL HYPERTENSION: ICD-10-CM

## 2024-01-22 DIAGNOSIS — Z13.220 ENCOUNTER FOR LIPID SCREENING FOR CARDIOVASCULAR DISEASE: ICD-10-CM

## 2024-01-22 DIAGNOSIS — M21.41 PES PLANUS OF BOTH FEET: ICD-10-CM

## 2024-01-22 DIAGNOSIS — L30.8 OTHER ECZEMA: ICD-10-CM

## 2024-01-22 DIAGNOSIS — N64.4 PAIN OF BOTH BREASTS: ICD-10-CM

## 2024-01-22 DIAGNOSIS — Z13.6 ENCOUNTER FOR LIPID SCREENING FOR CARDIOVASCULAR DISEASE: ICD-10-CM

## 2024-01-22 DIAGNOSIS — Z00.01 ENCOUNTER FOR WELL ADULT EXAM WITH ABNORMAL FINDINGS: Primary | ICD-10-CM

## 2024-01-22 DIAGNOSIS — Z13.1 DIABETES MELLITUS SCREENING: ICD-10-CM

## 2024-01-22 DIAGNOSIS — M21.42 PES PLANUS OF BOTH FEET: ICD-10-CM

## 2024-01-22 PROCEDURE — 3075F SYST BP GE 130 - 139MM HG: CPT | Performed by: FAMILY MEDICINE

## 2024-01-22 PROCEDURE — 1036F TOBACCO NON-USER: CPT | Performed by: FAMILY MEDICINE

## 2024-01-22 PROCEDURE — G8484 FLU IMMUNIZE NO ADMIN: HCPCS | Performed by: FAMILY MEDICINE

## 2024-01-22 PROCEDURE — G8417 CALC BMI ABV UP PARAM F/U: HCPCS | Performed by: FAMILY MEDICINE

## 2024-01-22 PROCEDURE — 3079F DIAST BP 80-89 MM HG: CPT | Performed by: FAMILY MEDICINE

## 2024-01-22 PROCEDURE — 3017F COLORECTAL CA SCREEN DOC REV: CPT | Performed by: FAMILY MEDICINE

## 2024-01-22 PROCEDURE — 99213 OFFICE O/P EST LOW 20 MIN: CPT | Performed by: FAMILY MEDICINE

## 2024-01-22 PROCEDURE — G8427 DOCREV CUR MEDS BY ELIG CLIN: HCPCS | Performed by: FAMILY MEDICINE

## 2024-01-22 PROCEDURE — 99396 PREV VISIT EST AGE 40-64: CPT | Performed by: FAMILY MEDICINE

## 2024-01-22 RX ORDER — ZOSTER VACCINE RECOMBINANT, ADJUVANTED 50 MCG/0.5
0.5 KIT INTRAMUSCULAR ONCE
Qty: 0.5 ML | Refills: 1 | Status: SHIPPED | OUTPATIENT
Start: 2024-01-22 | End: 2024-01-22

## 2024-01-22 RX ORDER — NAPROXEN 500 MG/1
500 TABLET ORAL 2 TIMES DAILY WITH MEALS
Qty: 180 TABLET | Refills: 0 | Status: SHIPPED | OUTPATIENT
Start: 2024-01-22

## 2024-01-22 RX ORDER — HALOBETASOL PROPIONATE 0.05 %
OINTMENT (GRAM) TOPICAL 2 TIMES DAILY
Qty: 50 G | Refills: 1 | Status: SHIPPED | OUTPATIENT
Start: 2024-01-22

## 2024-01-22 SDOH — ECONOMIC STABILITY: FOOD INSECURITY: WITHIN THE PAST 12 MONTHS, THE FOOD YOU BOUGHT JUST DIDN'T LAST AND YOU DIDN'T HAVE MONEY TO GET MORE.: NEVER TRUE

## 2024-01-22 SDOH — ECONOMIC STABILITY: FOOD INSECURITY: WITHIN THE PAST 12 MONTHS, YOU WORRIED THAT YOUR FOOD WOULD RUN OUT BEFORE YOU GOT MONEY TO BUY MORE.: NEVER TRUE

## 2024-01-22 SDOH — ECONOMIC STABILITY: INCOME INSECURITY: HOW HARD IS IT FOR YOU TO PAY FOR THE VERY BASICS LIKE FOOD, HOUSING, MEDICAL CARE, AND HEATING?: NOT HARD AT ALL

## 2024-01-22 SDOH — ECONOMIC STABILITY: HOUSING INSECURITY
IN THE LAST 12 MONTHS, WAS THERE A TIME WHEN YOU DID NOT HAVE A STEADY PLACE TO SLEEP OR SLEPT IN A SHELTER (INCLUDING NOW)?: NO

## 2024-01-22 ASSESSMENT — PATIENT HEALTH QUESTIONNAIRE - PHQ9
SUM OF ALL RESPONSES TO PHQ QUESTIONS 1-9: 0
SUM OF ALL RESPONSES TO PHQ9 QUESTIONS 1 & 2: 0
2. FEELING DOWN, DEPRESSED OR HOPELESS: 0
SUM OF ALL RESPONSES TO PHQ QUESTIONS 1-9: 0
1. LITTLE INTEREST OR PLEASURE IN DOING THINGS: 0

## 2024-01-22 NOTE — PROGRESS NOTES
Subjective:       Anamika Myers is a 53 y.o. female and is here for a comprehensive physical exam.  The patient reports problems - breast pain.  Bilaterally.  Patient would like to have the mammograms orders.  Gyn -will seen at her gynecologist in September did have a hemangioma/benign neoplasia removed from labia.  The patient feels it is coming back so she will make an appointment with gynecologist again.     History:  Any STD's in the past? none  Patient's medications, allergies, past medical, surgical, social and family histories were reviewed and updated as appropriate.    Do you take any herbs or supplements that were not prescribed by a doctor? No.  On and off she has been taking women's vitamin.  Are you taking calcium supplements? yes  Are you taking aspirin daily? not applicable    Review of Systems  Do you have pain that bothers you in your daily life? no  Review of Systems   Constitutional: Negative for fever and unexpected weight change.   Pertinent items are noted in HPI.    Objective:   HENT:   /88   Pulse 93   Temp 97.9 °F (36.6 °C)   Ht 1.727 m (5' 7.99\")   Wt 101.2 kg (223 lb)   SpO2 99%   BMI 33.92 kg/m²   Constitutional: Alert and oriented.  Well-nourished. No distress.  Head: Normocephalic and atraumatic.   Right Ear: External ear normal. TM: no bulging, erythema or fluid seen.  Left Ear: External ear normal. TM: no bulging, erythema or fluid seen.  Nose: Nose normal.   Mouth/Throat: Oropharynx is clear and moist. teeth in good repair.  Eyes: Pupils are equal, round, and reactive to light. Right eye exhibits no discharge. Left eye exhibits no discharge. No scleral icterus.   Neck: Normal range of motion. Neck supple. No JVD present. No tracheal deviation present. No thyromegaly present.   Cardiovascular: Normal rate, regular rhythm, normal heart sounds.    Pulmonary/Chest: Effort normal and breath sounds normal. No respiratory distress.  She has no wheezes.  She has no rales.

## 2024-07-26 ENCOUNTER — HOSPITAL ENCOUNTER (OUTPATIENT)
Age: 54
Setting detail: SPECIMEN
Discharge: HOME | End: 2024-07-26

## 2024-07-26 DIAGNOSIS — Z13.1 DIABETES MELLITUS SCREENING: ICD-10-CM

## 2024-07-26 DIAGNOSIS — I10 ESSENTIAL HYPERTENSION: ICD-10-CM

## 2024-07-26 DIAGNOSIS — Z13.220 ENCOUNTER FOR LIPID SCREENING FOR CARDIOVASCULAR DISEASE: ICD-10-CM

## 2024-07-26 DIAGNOSIS — Z13.6 ENCOUNTER FOR LIPID SCREENING FOR CARDIOVASCULAR DISEASE: ICD-10-CM

## 2024-07-26 LAB
ALBUMIN SERPL-MCNC: 3.8 G/DL (ref 3.5–5.2)
ALBUMIN/GLOB SERPL: 1 {RATIO} (ref 1–2.5)
ALP SERPL-CCNC: 57 U/L (ref 35–104)
ALT SERPL-CCNC: 10 U/L (ref 10–35)
ANION GAP SERPL CALCULATED.3IONS-SCNC: 9 MMOL/L (ref 9–16)
AST SERPL-CCNC: 17 U/L (ref 10–35)
BASOPHILS # BLD: 0.07 K/UL (ref 0–0.2)
BASOPHILS NFR BLD: 1 % (ref 0–2)
BILIRUB SERPL-MCNC: 0.4 MG/DL (ref 0–1.2)
BUN SERPL-MCNC: 11 MG/DL (ref 6–20)
CALCIUM SERPL-MCNC: 8.7 MG/DL (ref 8.6–10.4)
CHLORIDE SERPL-SCNC: 105 MMOL/L (ref 98–107)
CHOLEST SERPL-MCNC: 206 MG/DL (ref 0–199)
CHOLESTEROL/HDL RATIO: 4
CO2 SERPL-SCNC: 25 MMOL/L (ref 20–31)
CREAT SERPL-MCNC: 0.8 MG/DL (ref 0.5–0.9)
EOSINOPHIL # BLD: 0.08 K/UL (ref 0–0.44)
EOSINOPHILS RELATIVE PERCENT: 1 % (ref 1–4)
ERYTHROCYTE [DISTWIDTH] IN BLOOD BY AUTOMATED COUNT: 11.5 % (ref 11.8–14.4)
GFR, ESTIMATED: 83 ML/MIN/1.73M2
GLUCOSE SERPL-MCNC: 82 MG/DL (ref 74–99)
HCT VFR BLD AUTO: 40.8 % (ref 36.3–47.1)
HDLC SERPL-MCNC: 56 MG/DL
HGB BLD-MCNC: 13.2 G/DL (ref 11.9–15.1)
IMM GRANULOCYTES # BLD AUTO: <0.03 K/UL (ref 0–0.3)
IMM GRANULOCYTES NFR BLD: 0 %
INSULIN COMMENT: NORMAL
INSULIN REFERENCE RANGE:: NORMAL
INSULIN: 5.6 MU/L
LDLC SERPL CALC-MCNC: 137 MG/DL (ref 0–100)
LYMPHOCYTES NFR BLD: 1.92 K/UL (ref 1.1–3.7)
LYMPHOCYTES RELATIVE PERCENT: 29 % (ref 24–43)
MCH RBC QN AUTO: 29.7 PG (ref 25.2–33.5)
MCHC RBC AUTO-ENTMCNC: 32.4 G/DL (ref 28.4–34.8)
MCV RBC AUTO: 91.7 FL (ref 82.6–102.9)
MONOCYTES NFR BLD: 0.55 K/UL (ref 0.1–1.2)
MONOCYTES NFR BLD: 8 % (ref 3–12)
NEUTROPHILS NFR BLD: 61 % (ref 36–65)
NEUTS SEG NFR BLD: 3.9 K/UL (ref 1.5–8.1)
NRBC BLD-RTO: 0 PER 100 WBC
PLATELET # BLD AUTO: 343 K/UL (ref 138–453)
PMV BLD AUTO: 8.7 FL (ref 8.1–13.5)
POTASSIUM SERPL-SCNC: 4.2 MMOL/L (ref 3.7–5.3)
PROT SERPL-MCNC: 7 G/DL (ref 6.6–8.7)
RBC # BLD AUTO: 4.45 M/UL (ref 3.95–5.11)
SODIUM SERPL-SCNC: 139 MMOL/L (ref 136–145)
TRIGL SERPL-MCNC: 68 MG/DL
TSH SERPL DL<=0.05 MIU/L-ACNC: 0.49 UIU/ML (ref 0.27–4.2)
VLDLC SERPL CALC-MCNC: 14 MG/DL
WBC OTHER # BLD: 6.5 K/UL (ref 3.5–11.3)

## 2024-07-30 ENCOUNTER — PATIENT MESSAGE (OUTPATIENT)
Dept: FAMILY MEDICINE CLINIC | Age: 54
End: 2024-07-30

## 2024-07-30 DIAGNOSIS — E78.00 HYPERCHOLESTEREMIA: ICD-10-CM

## 2024-07-30 DIAGNOSIS — Z13.1 DIABETES MELLITUS SCREENING: Primary | ICD-10-CM

## 2024-07-30 NOTE — TELEPHONE ENCOUNTER
From: DEREK MACKENZIE  To: Anamika Myers  Sent: 7/30/2024 7:32 AM EDT  Subject: result    Dr PARK: \"LDL the bad cholesterol is slightly higher than before. I would like to start medication statin. Please let me know if she agrees.   All other blood work normal.\"

## 2024-07-31 ENCOUNTER — HOSPITAL ENCOUNTER (OUTPATIENT)
Age: 54
Setting detail: SPECIMEN
Discharge: HOME | End: 2024-07-31

## 2024-07-31 DIAGNOSIS — Z13.1 DIABETES MELLITUS SCREENING: ICD-10-CM

## 2024-07-31 LAB
EST. AVERAGE GLUCOSE BLD GHB EST-MCNC: 82 MG/DL
HBA1C MFR BLD: 4.5 % (ref 4–6)

## 2024-07-31 RX ORDER — ROSUVASTATIN CALCIUM 20 MG/1
20 TABLET, COATED ORAL NIGHTLY
Qty: 90 TABLET | Refills: 1 | Status: SHIPPED | OUTPATIENT
Start: 2024-07-31

## 2024-09-23 ENCOUNTER — TELEPHONE (OUTPATIENT)
Dept: FAMILY MEDICINE CLINIC | Age: 54
End: 2024-09-23

## 2024-09-23 DIAGNOSIS — R93.5 ABNORMAL CT OF THE ABDOMEN: Primary | ICD-10-CM

## 2024-10-07 ENCOUNTER — HOSPITAL ENCOUNTER (OUTPATIENT)
Dept: ULTRASOUND IMAGING | Age: 54
Discharge: HOME | End: 2024-10-09
Payer: COMMERCIAL

## 2024-10-07 ENCOUNTER — HOSPITAL ENCOUNTER (OUTPATIENT)
Dept: MAMMOGRAPHY | Age: 54
Discharge: HOME | End: 2024-10-09
Payer: COMMERCIAL

## 2024-10-07 DIAGNOSIS — R93.5 ABNORMAL CT OF THE ABDOMEN: ICD-10-CM

## 2024-10-07 DIAGNOSIS — N64.4 PAIN OF BOTH BREASTS: ICD-10-CM

## 2024-10-07 DIAGNOSIS — N64.4 BREAST PAIN, LEFT: ICD-10-CM

## 2024-10-07 PROCEDURE — G0279 TOMOSYNTHESIS, MAMMO: HCPCS

## 2024-10-07 PROCEDURE — 76642 ULTRASOUND BREAST LIMITED: CPT

## 2024-10-07 PROCEDURE — 76830 TRANSVAGINAL US NON-OB: CPT

## 2024-10-08 DIAGNOSIS — N83.201 HEMORRHAGIC CYST OF RIGHT OVARY: Primary | ICD-10-CM

## 2024-10-24 ENCOUNTER — TELEMEDICINE (OUTPATIENT)
Dept: FAMILY MEDICINE CLINIC | Age: 54
End: 2024-10-24
Payer: COMMERCIAL

## 2024-10-24 DIAGNOSIS — E66.811 CLASS 1 OBESITY DUE TO EXCESS CALORIES WITHOUT SERIOUS COMORBIDITY WITH BODY MASS INDEX (BMI) OF 33.0 TO 33.9 IN ADULT: Primary | ICD-10-CM

## 2024-10-24 DIAGNOSIS — N83.201 HEMORRHAGIC CYST OF RIGHT OVARY: ICD-10-CM

## 2024-10-24 DIAGNOSIS — E66.09 CLASS 1 OBESITY DUE TO EXCESS CALORIES WITHOUT SERIOUS COMORBIDITY WITH BODY MASS INDEX (BMI) OF 33.0 TO 33.9 IN ADULT: Primary | ICD-10-CM

## 2024-10-24 PROCEDURE — G8427 DOCREV CUR MEDS BY ELIG CLIN: HCPCS | Performed by: FAMILY MEDICINE

## 2024-10-24 PROCEDURE — G8417 CALC BMI ABV UP PARAM F/U: HCPCS | Performed by: FAMILY MEDICINE

## 2024-10-24 PROCEDURE — 1036F TOBACCO NON-USER: CPT | Performed by: FAMILY MEDICINE

## 2024-10-24 PROCEDURE — 3017F COLORECTAL CA SCREEN DOC REV: CPT | Performed by: FAMILY MEDICINE

## 2024-10-24 PROCEDURE — G8484 FLU IMMUNIZE NO ADMIN: HCPCS | Performed by: FAMILY MEDICINE

## 2024-10-24 PROCEDURE — 99213 OFFICE O/P EST LOW 20 MIN: CPT | Performed by: FAMILY MEDICINE

## 2024-10-24 RX ORDER — BUPROPION HYDROCHLORIDE 150 MG/1
150 TABLET ORAL EVERY MORNING
Qty: 30 TABLET | Refills: 3 | Status: SHIPPED | OUTPATIENT
Start: 2024-10-24

## 2024-10-24 ASSESSMENT — PATIENT HEALTH QUESTIONNAIRE - PHQ9
SUM OF ALL RESPONSES TO PHQ QUESTIONS 1-9: 0
2. FEELING DOWN, DEPRESSED OR HOPELESS: NOT AT ALL
SUM OF ALL RESPONSES TO PHQ QUESTIONS 1-9: 0
1. LITTLE INTEREST OR PLEASURE IN DOING THINGS: NOT AT ALL
SUM OF ALL RESPONSES TO PHQ9 QUESTIONS 1 & 2: 0

## 2024-10-24 NOTE — PROGRESS NOTES
General FM note    TeleHealth encounter -- Audio/Visual (During COVID-19 public health emergency)    Anamika Myers is a 54 y.o. female who presents today for follow up on her  medical conditions as noted below.  Anamika Myers is c/o of   Chief Complaint   Patient presents with    Discuss Labs    Weight Management     contrave       Patient Active Problem List:     Hypothyroidism     Insulin resistance     Right knee pain     History of endometriosis     Proctalgia fugax     Multiple nevi     Edema of both legs     History of Osgood-Schlatter disease     Breast pain     Hypertrophy of breast     Pain in neck     Past Medical History:   Diagnosis Date    Anxiety     Gout     Hypertension     Hypothyroidism     Insulin resistance     Diagnosed in 2000    Obesity       Past Surgical History:   Procedure Laterality Date    CYST REMOVAL  2000    FOOT SURGERY  10/2016    HYSTERECTOMY (CERVIX STATUS UNKNOWN)  12/2008    pt has right ovary    HYSTERECTOMY, TOTAL ABDOMINAL (CERVIX REMOVED)      LAPAROSCOPY       Family History   Problem Relation Age of Onset    Cancer Mother     Diabetes Father     High Blood Pressure Father     Arthritis Father     High Cholesterol Father     Obesity Father     Other Sister         BRAIN TUMOR    High Blood Pressure Maternal Grandmother     Cancer Maternal Grandfather         PROSTATE    Diabetes Maternal Grandfather     High Blood Pressure Paternal Grandmother      Current Outpatient Medications   Medication Sig Dispense Refill    naltrexone-buPROPion (CONTRAVE) 8-90 MG per extended release tablet Take 2 tablets by mouth 2 times daily 60 tablet 0    buPROPion (WELLBUTRIN XL) 150 MG extended release tablet Take 1 tablet by mouth every morning 30 tablet 3    rosuvastatin (CRESTOR) 20 MG tablet Take 1 tablet by mouth nightly 90 tablet 1    naproxen (NAPROSYN) 500 MG tablet Take 1 tablet by mouth 2 times daily (with meals) 180 tablet 0    halobetasol (ULTRAVATE) 0.05 % ointment Apply

## 2025-01-05 ENCOUNTER — PATIENT MESSAGE (OUTPATIENT)
Dept: FAMILY MEDICINE CLINIC | Age: 55
End: 2025-01-05

## 2025-02-05 ENCOUNTER — PATIENT MESSAGE (OUTPATIENT)
Dept: FAMILY MEDICINE CLINIC | Age: 55
End: 2025-02-05

## 2025-05-30 DIAGNOSIS — M25.511 RIGHT SHOULDER PAIN, UNSPECIFIED CHRONICITY: Primary | ICD-10-CM

## 2025-05-30 NOTE — PATIENT INSTRUCTIONS
PATIENTIQ:  PatientIQ helps [facility] stay in touch with you to know how you're feeling, and provides education and care instructions to you at various time points.   Your answers help your care team track your progress to provide the best care possible. PatientIQ will contact you pre-op and post-op via email or text with:  Educational Videos and Care Instructions  Questionnaires About How You're Feeling    Your participation provides you valuable education and helps [facility] continue to provide quality care to all patients. Thank you

## 2025-06-02 NOTE — PROGRESS NOTES
[facility] ORTHOPEDICS AND SPORTS MEDICINE  [address]  [place] [address]  Dept: [phone]  Dept Fax: [phone]        RIght shoulder- New Patient      Subjective:   Anamika Myers is a 54 y.o. year old female who presents to our office today for routine followup regarding her   1. Trigger point of right shoulder region    2. Cervical radiculopathy    .    Chief Complaint   Patient presents with    Shoulder Pain     Right Shoulder Pain           History of Present Illness    Elida is a 54-year-old female presents for evaluation of right shoulder pain/shoulder blade pain and neck pain. She did previously see me for right shoulder pain on 5/11/2022.     She reports experiencing pain in her right shoulder blade, which extends into her neck. She has not undergone any surgical procedures or therapeutic interventions for her neck. She is right-handed. Her last consultation with me was in 2022, during which she was diagnosed with issues in both her shoulder and neck. At that time, no radiographic imaging or injections were administered. Instead, she was prescribed a Medrol Dosepak, muscle relaxants, and physical therapy, which she completed and found beneficial.     Since 11/2024, she has been experiencing recurrent episodes of pain, which have now become a daily occurrence. The pain is severe enough to disrupt her sleep, radiating down behind her ear, under her jaw, and into her shoulder. She also reports radicular pain extending past her elbow, accompanied by numbness and tingling. Over the past few months, she has attempted to manage her symptoms with massage therapy and Motrin. She is not diabetic. She has noticed a worsening of her neck condition over the past 4 to 5 weeks, particularly at the back of her neck, and is concerned about potential carotid artery involvement due to persistent swelling. The pain intensifies when she

## 2025-06-03 ENCOUNTER — OFFICE VISIT (OUTPATIENT)
Dept: ORTHOPEDIC SURGERY | Age: 55
End: 2025-06-03
Payer: COMMERCIAL

## 2025-06-03 VITALS — OXYGEN SATURATION: 98 % | BODY MASS INDEX: 33.19 KG/M2 | RESPIRATION RATE: 16 BRPM | HEIGHT: 68 IN | WEIGHT: 219 LBS

## 2025-06-03 DIAGNOSIS — M54.12 CERVICAL RADICULOPATHY: ICD-10-CM

## 2025-06-03 DIAGNOSIS — M25.511 TRIGGER POINT OF RIGHT SHOULDER REGION: Primary | ICD-10-CM

## 2025-06-03 PROCEDURE — 1036F TOBACCO NON-USER: CPT | Performed by: PHYSICIAN ASSISTANT

## 2025-06-03 PROCEDURE — G8417 CALC BMI ABV UP PARAM F/U: HCPCS | Performed by: PHYSICIAN ASSISTANT

## 2025-06-03 PROCEDURE — 3017F COLORECTAL CA SCREEN DOC REV: CPT | Performed by: PHYSICIAN ASSISTANT

## 2025-06-03 PROCEDURE — 99214 OFFICE O/P EST MOD 30 MIN: CPT | Performed by: PHYSICIAN ASSISTANT

## 2025-06-03 PROCEDURE — G8427 DOCREV CUR MEDS BY ELIG CLIN: HCPCS | Performed by: PHYSICIAN ASSISTANT

## 2025-06-03 RX ORDER — METHYLPREDNISOLONE 4 MG/1
TABLET ORAL
Qty: 1 KIT | Refills: 0 | Status: SHIPPED | OUTPATIENT
Start: 2025-06-03

## 2025-06-03 RX ORDER — FEXOFENADINE HCL 180 MG/1
TABLET ORAL
COMMUNITY

## 2025-06-03 SDOH — ECONOMIC STABILITY: INCOME INSECURITY: IN THE LAST 12 MONTHS, WAS THERE A TIME WHEN YOU WERE NOT ABLE TO PAY THE MORTGAGE OR RENT ON TIME?: NO

## 2025-06-03 SDOH — ECONOMIC STABILITY: FOOD INSECURITY: WITHIN THE PAST 12 MONTHS, THE FOOD YOU BOUGHT JUST DIDN'T LAST AND YOU DIDN'T HAVE MONEY TO GET MORE.: NEVER TRUE

## 2025-06-03 SDOH — ECONOMIC STABILITY: FOOD INSECURITY: WITHIN THE PAST 12 MONTHS, YOU WORRIED THAT YOUR FOOD WOULD RUN OUT BEFORE YOU GOT MONEY TO BUY MORE.: NEVER TRUE

## 2025-06-03 SDOH — ECONOMIC STABILITY: TRANSPORTATION INSECURITY
IN THE PAST 12 MONTHS, HAS THE LACK OF TRANSPORTATION KEPT YOU FROM MEDICAL APPOINTMENTS OR FROM GETTING MEDICATIONS?: NO

## 2025-06-03 SDOH — ECONOMIC STABILITY: TRANSPORTATION INSECURITY
IN THE PAST 12 MONTHS, HAS LACK OF TRANSPORTATION KEPT YOU FROM MEETINGS, WORK, OR FROM GETTING THINGS NEEDED FOR DAILY LIVING?: NO

## 2025-06-03 ASSESSMENT — ENCOUNTER SYMPTOMS
CONSTIPATION: 0
COLOR CHANGE: 0
VOMITING: 0
COUGH: 0
ABDOMINAL DISTENTION: 0
ABDOMINAL PAIN: 0
GASTROINTESTINAL NEGATIVE: 1
DIARRHEA: 0
RESPIRATORY NEGATIVE: 1
APNEA: 0
CHEST TIGHTNESS: 0
NAUSEA: 0
SHORTNESS OF BREATH: 0

## 2025-06-05 ENCOUNTER — TELEPHONE (OUTPATIENT)
Dept: FAMILY MEDICINE CLINIC | Age: 55
End: 2025-06-05

## 2025-06-05 ENCOUNTER — TELEMEDICINE (OUTPATIENT)
Dept: FAMILY MEDICINE CLINIC | Age: 55
End: 2025-06-05
Payer: COMMERCIAL

## 2025-06-05 DIAGNOSIS — Z13.6 ENCOUNTER FOR LIPID SCREENING FOR CARDIOVASCULAR DISEASE: ICD-10-CM

## 2025-06-05 DIAGNOSIS — I10 ESSENTIAL HYPERTENSION: Primary | ICD-10-CM

## 2025-06-05 DIAGNOSIS — Z13.1 DIABETES MELLITUS SCREENING: ICD-10-CM

## 2025-06-05 DIAGNOSIS — Z13.220 ENCOUNTER FOR LIPID SCREENING FOR CARDIOVASCULAR DISEASE: ICD-10-CM

## 2025-06-05 DIAGNOSIS — R51.9 NEW ONSET HEADACHE: ICD-10-CM

## 2025-06-05 DIAGNOSIS — E78.00 HYPERCHOLESTEREMIA: ICD-10-CM

## 2025-06-05 PROCEDURE — 1036F TOBACCO NON-USER: CPT | Performed by: FAMILY MEDICINE

## 2025-06-05 PROCEDURE — G8417 CALC BMI ABV UP PARAM F/U: HCPCS | Performed by: FAMILY MEDICINE

## 2025-06-05 PROCEDURE — G8427 DOCREV CUR MEDS BY ELIG CLIN: HCPCS | Performed by: FAMILY MEDICINE

## 2025-06-05 PROCEDURE — 3017F COLORECTAL CA SCREEN DOC REV: CPT | Performed by: FAMILY MEDICINE

## 2025-06-05 PROCEDURE — 99214 OFFICE O/P EST MOD 30 MIN: CPT | Performed by: FAMILY MEDICINE

## 2025-06-05 RX ORDER — PRAVASTATIN SODIUM 20 MG
20 TABLET ORAL DAILY
Qty: 30 TABLET | Refills: 5 | Status: SHIPPED | OUTPATIENT
Start: 2025-06-05

## 2025-06-05 RX ORDER — VALSARTAN 80 MG/1
80 TABLET ORAL DAILY
Qty: 30 TABLET | Refills: 5 | Status: SHIPPED | OUTPATIENT
Start: 2025-06-05

## 2025-06-05 ASSESSMENT — PATIENT HEALTH QUESTIONNAIRE - PHQ9
1. LITTLE INTEREST OR PLEASURE IN DOING THINGS: NOT AT ALL
SUM OF ALL RESPONSES TO PHQ QUESTIONS 1-9: 0
2. FEELING DOWN, DEPRESSED OR HOPELESS: NOT AT ALL

## 2025-06-05 NOTE — PROGRESS NOTES
hypertension  valsartan (DIOVAN) 80 MG tablet    Hemoglobin A1C    TSH reflex to FT4    pravastatin (PRAVACHOL) 20 MG tablet      2. Diabetes mellitus screening  Comprehensive Metabolic Panel      3. Hypercholesteremia  Lipid Panel    Hemoglobin A1C    pravastatin (PRAVACHOL) 20 MG tablet      4. Encounter for lipid screening for cardiovascular disease  Lipid Panel      5. New onset headache  CT HEAD WO CONTRAST          Plan:   I will start the patient on a low-dose of valsartan.  She was in the 160s previously.  Will also start the patient on the pravastatin hopefully this will help her to avoid any joint aches.  Discussed with patient that I am a little bit worried about these intensive headaches even discomfort which she described at her right temple area I will order CT of her head to rule out any abnormalities.  The patient will call if any changes concerns.  Hopefully I will see her back in 2 weeks.    Call or return to clinic prn if these symptoms worsen or fail to improve as anticipated.  I have reviewed the instructions with the patient, answering all questions to her satisfaction.  Anamika Myers is a 54 y.o. female being evaluated by a Virtual Visit (video visit) encounter to address concerns as mentioned above.  A caregiver was present when appropriate. Due to this being a TeleHealth encounter (During COVID-19 public health emergency), evaluation of the following organ systems was limited: Vitals/Constitutional/EENT/Resp/CV/GI//MS/Neuro/Skin/Heme-Lymph-Imm.  Pursuant to the emergency declaration under the [place] Act and the National Emergencies Act, 1135 waiver authority and the Coronavirus Preparedness and Response Supplemental Appropriations Act, this Virtual Visit was conducted with patient's (and/or legal guardian's) consent, to reduce the patient's risk of exposure to COVID-19 and provide necessary medical care.  The patient (and/or legal guardian) has also been advised to contact this office

## 2025-06-10 ENCOUNTER — HOSPITAL ENCOUNTER (OUTPATIENT)
Dept: PHYSICAL THERAPY | Facility: CLINIC | Age: 55
Setting detail: THERAPIES SERIES
Discharge: HOME | End: 2025-06-10
Payer: COMMERCIAL

## 2025-06-10 PROCEDURE — 97161 PT EVAL LOW COMPLEX 20 MIN: CPT

## 2025-06-10 PROCEDURE — 97110 THERAPEUTIC EXERCISES: CPT

## 2025-06-10 NOTE — CONSULTS
[facility]  Outpatient Rehabilitation &  Therapy  [address]   P: [phone]  F: [phone]        Physical Therapy Upper Extremity Evaluation    Date:  6/10/2025  Patient: Anamika Myers    : 1970  MRN: [id number]  Referring Provider: Joanna Peres  Insurance: Medical [place] 20 v  Medical Diagnosis: RUE shoulder neck pain M25.511    Rehab Codes: M62.81 (Muscle Weakness), M62.9 (Disorder of Muscle), M79.1  Onset Date:     Next 's appt.: -       Subjective:   CC/HPI: Pt is a 54 yr female with RUE shoulder and neck pain, lower cervical pain. Present since . She is RUE handed. Disrupted sleep, increased BP, pain got worse. Tried Motrin, Massage Therapy, home massage gun but nothing took care of the problems.     Saw Ortho, steroid and neck XR (-), sent to PT at this time      PMHx: [x] Refer to full medical chart  In EPIC     [] Unremarkable                         Radiology: Results:     [x] X-RAY Findings: AP, lateral, odontoid view x-rays of the cervical spine done in the office today shows straightening of the normal cervical lordosis with nearly complete bridging anterior enthesophyte at C5-C6.  No further evidence of fracture, subluxation, dislocation, radiopaque foreign body, radiopaque tumors noted.     Impression: Cervical spondylosis as described above.          Two views of the right shoulder and 1 views of the scapula, including AP, scapular Y, and axillary views reveal: The glenohumeral joint is well reduced without arthritic changes.  Proximal migration of the humeral head has not occurred.  Acromion is a type III.  The acromioclavicular joint shows mild degenerative changes.        Impression: Negative radiographs of the right shoulder     [x] MRI    [] Other           ADL/IADL [x] Previously independent with all [x] Currently independent with all Who currently assists the patient with task     [] Previously independent with all except: []

## 2025-06-16 ENCOUNTER — HOSPITAL ENCOUNTER (OUTPATIENT)
Dept: ULTRASOUND IMAGING | Age: 55
Discharge: HOME | End: 2025-06-18
Payer: COMMERCIAL

## 2025-06-16 ENCOUNTER — HOSPITAL ENCOUNTER (OUTPATIENT)
Dept: CT IMAGING | Age: 55
Discharge: HOME | End: 2025-06-18
Payer: COMMERCIAL

## 2025-06-16 DIAGNOSIS — N83.201 HEMORRHAGIC CYST OF RIGHT OVARY: ICD-10-CM

## 2025-06-16 DIAGNOSIS — N83.201 CYST OF OVARY, RIGHT: ICD-10-CM

## 2025-06-16 DIAGNOSIS — R51.9 NEW ONSET HEADACHE: ICD-10-CM

## 2025-06-16 PROCEDURE — 76830 TRANSVAGINAL US NON-OB: CPT

## 2025-06-16 PROCEDURE — 70450 CT HEAD/BRAIN W/O DYE: CPT

## 2025-06-17 ENCOUNTER — HOSPITAL ENCOUNTER (OUTPATIENT)
Age: 55
Discharge: HOME | End: 2025-06-17
Payer: COMMERCIAL

## 2025-06-17 DIAGNOSIS — Z13.220 ENCOUNTER FOR LIPID SCREENING FOR CARDIOVASCULAR DISEASE: ICD-10-CM

## 2025-06-17 DIAGNOSIS — Z13.6 ENCOUNTER FOR LIPID SCREENING FOR CARDIOVASCULAR DISEASE: ICD-10-CM

## 2025-06-17 DIAGNOSIS — Z13.1 DIABETES MELLITUS SCREENING: ICD-10-CM

## 2025-06-17 DIAGNOSIS — I10 ESSENTIAL HYPERTENSION: ICD-10-CM

## 2025-06-17 DIAGNOSIS — E78.00 HYPERCHOLESTEREMIA: ICD-10-CM

## 2025-06-17 LAB
ALBUMIN SERPL-MCNC: 3.7 G/DL (ref 3.5–5.2)
ALBUMIN/GLOB SERPL: 1.2 {RATIO} (ref 1–2.5)
ALP SERPL-CCNC: 65 U/L (ref 35–104)
ALT SERPL-CCNC: 13 U/L (ref 10–35)
ANION GAP SERPL CALCULATED.3IONS-SCNC: 8 MMOL/L (ref 9–16)
AST SERPL-CCNC: 17 U/L (ref 10–35)
BILIRUB SERPL-MCNC: 0.4 MG/DL (ref 0–1.2)
BUN SERPL-MCNC: 12 MG/DL (ref 6–20)
CALCIUM SERPL-MCNC: 8.8 MG/DL (ref 8.6–10.4)
CHLORIDE SERPL-SCNC: 105 MMOL/L (ref 98–107)
CHOLEST SERPL-MCNC: 198 MG/DL (ref 0–199)
CHOLESTEROL/HDL RATIO: 3.5
CO2 SERPL-SCNC: 26 MMOL/L (ref 20–31)
CREAT SERPL-MCNC: 0.8 MG/DL (ref 0.6–0.9)
EST. AVERAGE GLUCOSE BLD GHB EST-MCNC: 91 MG/DL
GFR, ESTIMATED: 88 ML/MIN/1.73M2
GLUCOSE SERPL-MCNC: 95 MG/DL (ref 74–99)
HBA1C MFR BLD: 4.8 % (ref 4–6)
HDLC SERPL-MCNC: 56 MG/DL
LDLC SERPL CALC-MCNC: 129 MG/DL (ref 0–100)
POTASSIUM SERPL-SCNC: 3.8 MMOL/L (ref 3.7–5.3)
PROT SERPL-MCNC: 6.7 G/DL (ref 6.6–8.7)
SODIUM SERPL-SCNC: 139 MMOL/L (ref 136–145)
TRIGL SERPL-MCNC: 66 MG/DL
TSH SERPL DL<=0.05 MIU/L-ACNC: 0.47 UIU/ML (ref 0.27–4.2)
VLDLC SERPL CALC-MCNC: 13 MG/DL (ref 1–30)

## 2025-06-17 PROCEDURE — 84443 ASSAY THYROID STIM HORMONE: CPT

## 2025-06-17 PROCEDURE — 36415 COLL VENOUS BLD VENIPUNCTURE: CPT

## 2025-06-17 PROCEDURE — 83036 HEMOGLOBIN GLYCOSYLATED A1C: CPT

## 2025-06-17 PROCEDURE — 80053 COMPREHEN METABOLIC PANEL: CPT

## 2025-06-17 PROCEDURE — 80061 LIPID PANEL: CPT

## 2025-06-18 ENCOUNTER — RESULTS FOLLOW-UP (OUTPATIENT)
Dept: FAMILY MEDICINE CLINIC | Age: 55
End: 2025-06-18

## 2025-06-19 ENCOUNTER — RESULTS FOLLOW-UP (OUTPATIENT)
Dept: FAMILY MEDICINE CLINIC | Age: 55
End: 2025-06-19

## 2025-06-20 ENCOUNTER — HOSPITAL ENCOUNTER (OUTPATIENT)
Dept: PHYSICAL THERAPY | Facility: CLINIC | Age: 55
Setting detail: THERAPIES SERIES
Discharge: HOME | End: 2025-06-20
Payer: COMMERCIAL

## 2025-06-20 PROCEDURE — 97140 MANUAL THERAPY 1/> REGIONS: CPT

## 2025-06-20 PROCEDURE — 97110 THERAPEUTIC EXERCISES: CPT

## 2025-06-20 NOTE — FLOWSHEET NOTE
[facility]  Outpatient Rehabilitation &  Therapy  [address]   [address]   P: [phone]  F: [phone]      Physical Therapy Daily Treatment Note    Date:  2025  Patient Name:  Anamika Myers    :  1970  MRN: [id number]  Referring Provider: Joanna Peres                   Insurance: Medical [place] 20 v  Medical Diagnosis: RUE shoulder neck pain M25.511                       Rehab Codes: M62.81 (Muscle Weakness), M62.9 (Disorder of Muscle), M79.1  Onset Date:                                           Next 's appt.: -  Visit# / total visits:     Cancels/No Shows: 0/0    Subjective:    Pain:  [] Yes  [x] No Location: R shoulder/neck Pain Rating: (0-10 scale) 7/10  Pain altered Tx:  [x] No  [] Yes  Action:  Comments: Patient states she was doing well the last few days but recently her neck has begun to tighten up on the R side and she has not taken her muscle relaxer the last 3 days because she thought she didn't need to.                 Objective:  Today’s Treatment:  Precautions: Standard   Exercise       Reps/ Time Weight/ Level Comments             Bike UE  5'                 Upper Trap Stretch  3x30\"   HEP   Levator Scap Stretch  3x30\"   HEP   Shoulder Retractions  HEP      Corner Pec Stretch  3x30\"  T/Y HEP   Chin tucks supine                    Resistance Band         Retraction  x20 Green HEP   Ext  x20 Green HEP   ER  x20 Green HEP   IR  x20 Green HEP   HAB  Next                Prone mat         Retraction  10x5\"       Depression  10x10\"             Prone bench       Extension         HAB         Scaption         Flexion                    Supine program         Punches  x20 0#     ABCs  Next        SL ER  Next                                      Somatic Dysfunctions Location  Dysfunction  Treatment  Position Range of force   Thoracic  [] T1 FRS_[]R []L  ERS_[]R []L   NS__[]R []L    [] MET   [] MOB   [] Manipulation [] Supine  [] Prone  [] Seated  []

## 2025-06-23 ENCOUNTER — OFFICE VISIT (OUTPATIENT)
Dept: FAMILY MEDICINE CLINIC | Age: 55
End: 2025-06-23
Payer: COMMERCIAL

## 2025-06-23 VITALS
BODY MASS INDEX: 33.06 KG/M2 | WEIGHT: 217.4 LBS | OXYGEN SATURATION: 99 % | HEART RATE: 72 BPM | SYSTOLIC BLOOD PRESSURE: 110 MMHG | TEMPERATURE: 97.2 F | DIASTOLIC BLOOD PRESSURE: 70 MMHG

## 2025-06-23 DIAGNOSIS — R93.89 ABNORMAL ULTRASOUND OF PELVIS: Primary | ICD-10-CM

## 2025-06-23 DIAGNOSIS — R03.1 LOW BLOOD PRESSURE READING: ICD-10-CM

## 2025-06-23 PROCEDURE — 99213 OFFICE O/P EST LOW 20 MIN: CPT | Performed by: FAMILY MEDICINE

## 2025-06-23 PROCEDURE — 3017F COLORECTAL CA SCREEN DOC REV: CPT | Performed by: FAMILY MEDICINE

## 2025-06-23 PROCEDURE — G8417 CALC BMI ABV UP PARAM F/U: HCPCS | Performed by: FAMILY MEDICINE

## 2025-06-23 PROCEDURE — 1036F TOBACCO NON-USER: CPT | Performed by: FAMILY MEDICINE

## 2025-06-23 PROCEDURE — G8427 DOCREV CUR MEDS BY ELIG CLIN: HCPCS | Performed by: FAMILY MEDICINE

## 2025-06-23 ASSESSMENT — PATIENT HEALTH QUESTIONNAIRE - PHQ9
SUM OF ALL RESPONSES TO PHQ QUESTIONS 1-9: 0
SUM OF ALL RESPONSES TO PHQ QUESTIONS 1-9: 0
1. LITTLE INTEREST OR PLEASURE IN DOING THINGS: NOT AT ALL
SUM OF ALL RESPONSES TO PHQ QUESTIONS 1-9: 0
SUM OF ALL RESPONSES TO PHQ QUESTIONS 1-9: 0
2. FEELING DOWN, DEPRESSED OR HOPELESS: NOT AT ALL

## 2025-06-23 NOTE — PROGRESS NOTES
General FM note    Anamika Myers is a 54 y.o. female who presents today for follow up on her  medical conditions as noted below.  Anamika Myers is c/o of   Chief Complaint   Patient presents with    Hypertension       Patient Active Problem List:     Hypothyroidism     Insulin resistance     Right knee pain     History of endometriosis     Proctalgia fugax     Multiple nevi     Edema of both legs     History of Osgood-Schlatter disease     Breast pain     Hypertrophy of breast     Pain in neck     Past Medical History:   Diagnosis Date    Anxiety     Gout     Hypertension     Hypothyroidism     Insulin resistance     Diagnosed in 2000    Obesity       Past Surgical History:   Procedure Laterality Date    CYST REMOVAL  2000    FOOT SURGERY  10/2016    HYSTERECTOMY (CERVIX STATUS UNKNOWN)  12/2008    pt has right ovary    HYSTERECTOMY, TOTAL ABDOMINAL (CERVIX REMOVED)      LAPAROSCOPY       Family History   Problem Relation Age of Onset    Cancer Mother     Diabetes Father     High Blood Pressure Father     Arthritis Father     High Cholesterol Father     Obesity Father     Other Sister         BRAIN TUMOR    High Blood Pressure Maternal Grandmother     Cancer Maternal Grandfather         PROSTATE    Diabetes Maternal Grandfather     High Blood Pressure Paternal Grandmother      Current Outpatient Medications   Medication Sig Dispense Refill    pravastatin (PRAVACHOL) 20 MG tablet Take 1 tablet by mouth daily 30 tablet 5    fexofenadine (ALLEGRA) 180 MG tablet 1 tablet Orally Once a day for 30      methylPREDNISolone (MEDROL DOSEPACK) 4 MG tablet Take by mouth. 1 kit 0    naproxen (NAPROSYN) 500 MG tablet Take 1 tablet by mouth 2 times daily (with meals) 180 tablet 0    halobetasol (ULTRAVATE) 0.05 % ointment Apply topically 2 times daily APPLY TO AFFECTED AREA 50 g 1    magnesium citrate solution Take 600 mLs by mouth once      Multiple Vitamins-Minerals (THERAPEUTIC MULTIVITAMIN-MINERALS) tablet Take 1 tablet by

## 2025-06-27 ENCOUNTER — HOSPITAL ENCOUNTER (OUTPATIENT)
Dept: PHYSICAL THERAPY | Facility: CLINIC | Age: 55
Setting detail: THERAPIES SERIES
Discharge: HOME | End: 2025-06-27
Payer: COMMERCIAL

## 2025-06-27 PROCEDURE — 97110 THERAPEUTIC EXERCISES: CPT

## 2025-06-27 PROCEDURE — 97140 MANUAL THERAPY 1/> REGIONS: CPT

## 2025-06-27 NOTE — FLOWSHEET NOTE
[facility]  Outpatient Rehabilitation &  Therapy  [address]   [address]   P: [phone]  F: [phone]      Physical Therapy Daily Treatment Note    Date:  2025  Patient Name:  Anamika Myers    :  1970  MRN: [id number]  Referring Provider: Joanna Peres                   Insurance: Medical [place] 20 v  Medical Diagnosis: RUE shoulder neck pain M25.511                       Rehab Codes: M62.81 (Muscle Weakness), M62.9 (Disorder of Muscle), M79.1  Onset Date:                                           Next 's appt.: -  Visit# / total visits:     Cancels/No Shows: 0/0    Subjective:    Pain:  [] Yes  [x] No Location: R shoulder/neck Pain Rating: (0-10 scale) 7/10  Pain altered Tx:  [x] No  [] Yes  Action:  Comments: Patient states she was doing well the last few days but recently her neck has begun to tighten up on the R side and she has not taken her muscle relaxer the last 3 days because she thought she didn't need to.                 Objective:  Today’s Treatment:  Precautions: Standard   Exercise       Reps/ Time Weight/ Level Comments             Bike UE  5'                 Upper Trap Stretch  3x30\"   HEP   Levator Scap Stretch  3x30\"   HEP   Shoulder Retractions  HEP      Corner Pec Stretch  3x30\"  T/Y HEP   Chin tucks supine                    Resistance Band         Retraction  x20 Green HEP   Ext  x20 Green HEP   ER  x20 Green HEP   IR  x20 Green HEP   HAB  x20 Green                      Prone bench       Extension  x20 4#      Rows  x20 4#    HAB  x20      Scaption         Flexion                    Supine program         Punches  x20 4#     ABCs  x20 4#     Side ER  x20 4#                                    Somatic Dysfunctions Location  Dysfunction  Treatment  Position Range of force   Thoracic  [] T1 FRS_[]R []L  ERS_[]R []L   NS__[]R []L    [] MET   [] MOB   [] Manipulation [] Supine  [] Prone  [] Seated  [] Sidelying  [] Beginning  [] Middle  []

## 2025-07-03 ENCOUNTER — HOSPITAL ENCOUNTER (OUTPATIENT)
Dept: PHYSICAL THERAPY | Facility: CLINIC | Age: 55
Setting detail: THERAPIES SERIES
Discharge: HOME | End: 2025-07-03
Payer: COMMERCIAL

## 2025-07-03 PROCEDURE — 97110 THERAPEUTIC EXERCISES: CPT

## 2025-07-03 NOTE — FLOWSHEET NOTE
[facility]  Outpatient Rehabilitation &  Therapy  [address]   [address]   P: [phone]  F: [phone]      Physical Therapy Daily Treatment Note    Date:  7/3/2025  Patient Name:  Anamika Myers    :  1970  MRN: [id number]  Referring Provider: Joanna Peres                   Insurance: Medical [place] 20 v  Medical Diagnosis: RUE shoulder neck pain M25.511                       Rehab Codes: M62.81 (Muscle Weakness), M62.9 (Disorder of Muscle), M79.1  Onset Date:                                           Next 's appt.: -  Visit# / total visits:     Cancels/No Shows: 0/0    Subjective:    Pain:  [x] Yes  [] No Location: R shoulder/neck Pain Rating: (0-10 scale) 3.5/10  Pain altered Tx:  [x] No  [] Yes  Action:  Comments: Patient states she felt nauseous after the manipulation last visit and would like to hold off on that today. States less pain overall. Patient is still using the thera-cane on her upper trap musculature and has not had to use it at often. States she does continue to get pain if she sits or lays down too long.                 Objective:  Today’s Treatment:  Precautions: Standard   Exercise       Reps/ Time Weight/ Level Comments             Bike UE  5'                 Upper Trap Stretch  3x30\"   HEP   Levator Scap Stretch  3x30\"   HEP   Shoulder Retractions  HEP      Corner Pec Stretch  3x30\"  T/Y HEP             Resistance Band         Retraction  x20 Green HEP   Ext  x20 Green HEP   ER  x20 Green HEP   IR  x20 Green HEP   HAB  x20 Green           Prone bench       Extension  x20 4#      Rows  x20 4#    HAB  x20 4#     Scaption  x20 4#     Flexion                   Shoulder abduction  x20 2#    Shoulder scaption  x20 2#    Shoulder flexion  x20 2#          Supine program         Punches  x20 4#     ABCs  x20 4#     Side ER  x20 4#                                      Specific Instructions for next treatment: therex, manual          Treatment Charges:

## 2025-07-10 ENCOUNTER — HOSPITAL ENCOUNTER (OUTPATIENT)
Dept: PHYSICAL THERAPY | Facility: CLINIC | Age: 55
Setting detail: THERAPIES SERIES
Discharge: HOME | End: 2025-07-10
Payer: COMMERCIAL

## 2025-07-10 NOTE — CARE COORDINATION
[] [facility]  Outpatient Rehabilitation &  Therapy  [address]  [phone]  [phone] [] [facility]  Outpatient Rehabilitation &  Therapy  [address]  P: [phone]  F: [phone] [] [facility]  Outpatient Rehabilitation &  Therapy  [address]  P: [phone]  F: [phone] [] [facility]  Outpatient Rehabilitation &  Therapy  [address]  [phone]  [phone] [] [facility]  Outpatient Rehabilitation &  Therapy  [address]   P: [phone]  F: [phone]  [] [facility]  Outpatient Rehabilitation &  Therapy  [address]  P: [phone]  F: [phone] [] [facility]  Outpatient Rehabilitation &  Therapy  [address]  [address]  P: [phone]  F: [phone] [] [facility]  Outpatient Rehabilitation &  Therapy  [address]  P: [phone]  F: [phone] [] [facility]  Outpatient Rehabilitation &  Therapy  [address]  P: [phone]  F: [phone]  [] [facility] Outpatient Rehabilitation &  Therapy  [address]  P: [phone]  F: [phone] [] [facility] Outpatient Rehabilitation &  Therapy  [address]  [address]  P: [phone]   F: [phone]      Therapy Cancel/No Show note    Date: 7/10/2025  Patient: Anamika Myers  : 1970  MRN: [id number]    Cancels/No Shows to date:     For today's appointment patient:    [x]  Cancelled    [] Rescheduled appointment    [] No-show     Reason given by patient:    []  Patient ill    [x]  Conflicting appointment    [] No transportation      [] Conflict with work    [] No reason given    [] Weather related    [] COVID-19    [x] Other:      Comments:        [x] Next appointment was confirmed: Patient left a

## 2025-07-15 RX ORDER — NAPROXEN 500 MG/1
500 TABLET ORAL 2 TIMES DAILY WITH MEALS
Qty: 180 TABLET | Refills: 0 | Status: SHIPPED | OUTPATIENT
Start: 2025-07-15